# Patient Record
Sex: MALE | Race: WHITE | NOT HISPANIC OR LATINO | URBAN - METROPOLITAN AREA
[De-identification: names, ages, dates, MRNs, and addresses within clinical notes are randomized per-mention and may not be internally consistent; named-entity substitution may affect disease eponyms.]

---

## 2019-04-26 ENCOUNTER — OUTPATIENT (OUTPATIENT)
Dept: OUTPATIENT SERVICES | Facility: HOSPITAL | Age: 54
LOS: 1 days | Discharge: STILL ADMITTED OR ADMINISTRATIVE | End: 2019-04-26

## 2019-04-26 ENCOUNTER — INPATIENT (INPATIENT)
Facility: HOSPITAL | Age: 54
LOS: 13 days | Discharge: HOME | End: 2019-05-10
Attending: PSYCHIATRY & NEUROLOGY | Admitting: PSYCHIATRY & NEUROLOGY
Payer: COMMERCIAL

## 2019-04-26 VITALS — TEMPERATURE: 98 F

## 2019-04-26 DIAGNOSIS — F31.0 BIPOLAR DISORDER, CURRENT EPISODE HYPOMANIC: ICD-10-CM

## 2019-04-26 PROCEDURE — 90792 PSYCH DIAG EVAL W/MED SRVCS: CPT

## 2019-04-26 RX ORDER — LANOLIN ALCOHOL/MO/W.PET/CERES
3 CREAM (GRAM) TOPICAL ONCE
Qty: 0 | Refills: 0 | Status: COMPLETED | OUTPATIENT
Start: 2019-04-26 | End: 2019-04-26

## 2019-04-26 RX ORDER — VENLAFAXINE HCL 75 MG
150 CAPSULE, EXT RELEASE 24 HR ORAL DAILY
Qty: 0 | Refills: 0 | Status: DISCONTINUED | OUTPATIENT
Start: 2019-04-27 | End: 2019-04-30

## 2019-04-26 RX ORDER — GEMFIBROZIL 600 MG
600 TABLET ORAL
Qty: 0 | Refills: 0 | Status: DISCONTINUED | OUTPATIENT
Start: 2019-04-26 | End: 2019-05-10

## 2019-04-26 RX ORDER — LAMOTRIGINE 25 MG/1
1 TABLET, ORALLY DISINTEGRATING ORAL
Qty: 0 | Refills: 0 | COMMUNITY

## 2019-04-26 RX ORDER — LURASIDONE HYDROCHLORIDE 40 MG/1
40 TABLET ORAL ONCE
Qty: 0 | Refills: 0 | Status: COMPLETED | OUTPATIENT
Start: 2019-04-26 | End: 2019-04-26

## 2019-04-26 RX ORDER — LAMOTRIGINE 25 MG/1
150 TABLET, ORALLY DISINTEGRATING ORAL DAILY
Qty: 0 | Refills: 0 | Status: DISCONTINUED | OUTPATIENT
Start: 2019-04-26 | End: 2019-04-26

## 2019-04-26 RX ORDER — LURASIDONE HYDROCHLORIDE 40 MG/1
40 TABLET ORAL
Qty: 0 | Refills: 0 | Status: DISCONTINUED | OUTPATIENT
Start: 2019-04-27 | End: 2019-04-30

## 2019-04-26 RX ORDER — LURASIDONE HYDROCHLORIDE 40 MG/1
40 TABLET ORAL DAILY
Qty: 0 | Refills: 0 | Status: DISCONTINUED | OUTPATIENT
Start: 2019-04-26 | End: 2019-04-26

## 2019-04-26 RX ORDER — VENLAFAXINE HCL 75 MG
187.5 CAPSULE, EXT RELEASE 24 HR ORAL DAILY
Qty: 0 | Refills: 0 | Status: DISCONTINUED | OUTPATIENT
Start: 2019-04-26 | End: 2019-04-26

## 2019-04-26 RX ADMIN — Medication 600 MILLIGRAM(S): at 22:07

## 2019-04-26 RX ADMIN — Medication 3 MILLIGRAM(S): at 22:45

## 2019-04-26 RX ADMIN — LURASIDONE HYDROCHLORIDE 40 MILLIGRAM(S): 40 TABLET ORAL at 17:05

## 2019-04-27 DIAGNOSIS — I10 ESSENTIAL (PRIMARY) HYPERTENSION: ICD-10-CM

## 2019-04-27 DIAGNOSIS — F31.0 BIPOLAR DISORDER, CURRENT EPISODE HYPOMANIC: ICD-10-CM

## 2019-04-27 DIAGNOSIS — E78.5 HYPERLIPIDEMIA, UNSPECIFIED: ICD-10-CM

## 2019-04-27 LAB
ALBUMIN SERPL ELPH-MCNC: 4.9 G/DL — SIGNIFICANT CHANGE UP (ref 3.3–5)
ALP SERPL-CCNC: 74 U/L — SIGNIFICANT CHANGE UP (ref 40–120)
ALT FLD-CCNC: 24 U/L — SIGNIFICANT CHANGE UP (ref 4–41)
ANION GAP SERPL CALC-SCNC: 14 MMO/L — SIGNIFICANT CHANGE UP (ref 7–14)
APAP SERPL-MCNC: < 15 UG/ML — LOW (ref 15–25)
AST SERPL-CCNC: 16 U/L — SIGNIFICANT CHANGE UP (ref 4–40)
BASOPHILS # BLD AUTO: 0.04 K/UL — SIGNIFICANT CHANGE UP (ref 0–0.2)
BASOPHILS NFR BLD AUTO: 0.7 % — SIGNIFICANT CHANGE UP (ref 0–2)
BILIRUB SERPL-MCNC: 0.7 MG/DL — SIGNIFICANT CHANGE UP (ref 0.2–1.2)
BUN SERPL-MCNC: 15 MG/DL — SIGNIFICANT CHANGE UP (ref 7–23)
CALCIUM SERPL-MCNC: 9.6 MG/DL — SIGNIFICANT CHANGE UP (ref 8.4–10.5)
CHLORIDE SERPL-SCNC: 101 MMOL/L — SIGNIFICANT CHANGE UP (ref 98–107)
CHOLEST SERPL-MCNC: 247 MG/DL — HIGH (ref 120–199)
CO2 SERPL-SCNC: 24 MMOL/L — SIGNIFICANT CHANGE UP (ref 22–31)
CREAT SERPL-MCNC: 0.91 MG/DL — SIGNIFICANT CHANGE UP (ref 0.5–1.3)
EOSINOPHIL # BLD AUTO: 0.07 K/UL — SIGNIFICANT CHANGE UP (ref 0–0.5)
EOSINOPHIL NFR BLD AUTO: 1.2 % — SIGNIFICANT CHANGE UP (ref 0–6)
ETHANOL BLD-MCNC: < 10 MG/DL — SIGNIFICANT CHANGE UP
GLUCOSE SERPL-MCNC: 157 MG/DL — HIGH (ref 70–99)
HBA1C BLD-MCNC: 5.6 % — SIGNIFICANT CHANGE UP (ref 4–5.6)
HCT VFR BLD CALC: 45.2 % — SIGNIFICANT CHANGE UP (ref 39–50)
HDLC SERPL-MCNC: 57 MG/DL — HIGH (ref 35–55)
HGB BLD-MCNC: 15.2 G/DL — SIGNIFICANT CHANGE UP (ref 13–17)
IMM GRANULOCYTES NFR BLD AUTO: 0.3 % — SIGNIFICANT CHANGE UP (ref 0–1.5)
LIPID PNL WITH DIRECT LDL SERPL: 185 MG/DL — SIGNIFICANT CHANGE UP
LYMPHOCYTES # BLD AUTO: 1.24 K/UL — SIGNIFICANT CHANGE UP (ref 1–3.3)
LYMPHOCYTES # BLD AUTO: 20.6 % — SIGNIFICANT CHANGE UP (ref 13–44)
MCHC RBC-ENTMCNC: 29.5 PG — SIGNIFICANT CHANGE UP (ref 27–34)
MCHC RBC-ENTMCNC: 33.6 % — SIGNIFICANT CHANGE UP (ref 32–36)
MCV RBC AUTO: 87.8 FL — SIGNIFICANT CHANGE UP (ref 80–100)
MONOCYTES # BLD AUTO: 0.43 K/UL — SIGNIFICANT CHANGE UP (ref 0–0.9)
MONOCYTES NFR BLD AUTO: 7.1 % — SIGNIFICANT CHANGE UP (ref 2–14)
NEUTROPHILS # BLD AUTO: 4.23 K/UL — SIGNIFICANT CHANGE UP (ref 1.8–7.4)
NEUTROPHILS NFR BLD AUTO: 70.1 % — SIGNIFICANT CHANGE UP (ref 43–77)
NRBC # FLD: 0 K/UL — SIGNIFICANT CHANGE UP (ref 0–0)
PLATELET # BLD AUTO: 180 K/UL — SIGNIFICANT CHANGE UP (ref 150–400)
PMV BLD: 11.4 FL — SIGNIFICANT CHANGE UP (ref 7–13)
POTASSIUM SERPL-MCNC: 4.1 MMOL/L — SIGNIFICANT CHANGE UP (ref 3.5–5.3)
POTASSIUM SERPL-SCNC: 4.1 MMOL/L — SIGNIFICANT CHANGE UP (ref 3.5–5.3)
PROT SERPL-MCNC: 7.5 G/DL — SIGNIFICANT CHANGE UP (ref 6–8.3)
RBC # BLD: 5.15 M/UL — SIGNIFICANT CHANGE UP (ref 4.2–5.8)
RBC # FLD: 13.3 % — SIGNIFICANT CHANGE UP (ref 10.3–14.5)
SALICYLATES SERPL-MCNC: < 5 MG/DL — LOW (ref 15–30)
SODIUM SERPL-SCNC: 139 MMOL/L — SIGNIFICANT CHANGE UP (ref 135–145)
TRIGL SERPL-MCNC: 139 MG/DL — SIGNIFICANT CHANGE UP (ref 10–149)
TSH SERPL-MCNC: 2.01 UIU/ML — SIGNIFICANT CHANGE UP (ref 0.27–4.2)
WBC # BLD: 6.03 K/UL — SIGNIFICANT CHANGE UP (ref 3.8–10.5)
WBC # FLD AUTO: 6.03 K/UL — SIGNIFICANT CHANGE UP (ref 3.8–10.5)

## 2019-04-27 PROCEDURE — 99232 SBSQ HOSP IP/OBS MODERATE 35: CPT

## 2019-04-27 PROCEDURE — 99223 1ST HOSP IP/OBS HIGH 75: CPT

## 2019-04-27 PROCEDURE — 93010 ELECTROCARDIOGRAM REPORT: CPT

## 2019-04-27 RX ORDER — AMLODIPINE BESYLATE 2.5 MG/1
2.5 TABLET ORAL DAILY
Qty: 0 | Refills: 0 | Status: DISCONTINUED | OUTPATIENT
Start: 2019-04-27 | End: 2019-05-01

## 2019-04-27 RX ADMIN — Medication 600 MILLIGRAM(S): at 21:25

## 2019-04-27 RX ADMIN — Medication 600 MILLIGRAM(S): at 09:05

## 2019-04-27 RX ADMIN — AMLODIPINE BESYLATE 2.5 MILLIGRAM(S): 2.5 TABLET ORAL at 11:46

## 2019-04-27 RX ADMIN — LURASIDONE HYDROCHLORIDE 40 MILLIGRAM(S): 40 TABLET ORAL at 16:34

## 2019-04-27 RX ADMIN — Medication 150 MILLIGRAM(S): at 09:05

## 2019-04-27 NOTE — CONSULT NOTE ADULT - ASSESSMENT
54M with HLD and Bipolar d/o admitted for hypomanic episode. Med consult for HTN. Patient is asymptomatic. likely underline undiagnosed HTN. more diastolic.

## 2019-04-27 NOTE — CONSULT NOTE ADULT - SUBJECTIVE AND OBJECTIVE BOX
HPI:   54M with PMH HLD, Bipolar d/o who was admitted for [facility] for hypomanic episode. Medicine was consulted for management of new found hypertension. Patient denied history of HTN. He reports only outpatient follow up for annual physicals. Denied any chest pain, SOB, LYNN, dizziness, headache. He reports he does feel a little bit anxious while here, but otherwise feels well. Reviewing of patients vitals showing persistent elevated diastolic BP and now with systolic elevation as well.     PAST MEDICAL & SURGICAL HISTORY:  Hyperlipidemia [E78.5]    Review of Systems:   CONSTITUTIONAL: No fever, weight loss, or fatigue  EYES: No eye pain, visual disturbances, or discharge  ENMT:  No difficulty hearing, tinnitus, vertigo; No sinus or throat pain  NECK: No pain or stiffness  RESPIRATORY: No cough, wheezing, chills or hemoptysis; No shortness of breath  CARDIOVASCULAR: No chest pain, palpitations, dizziness, or leg swelling  GASTROINTESTINAL: No abdominal or epigastric pain. No nausea, vomiting, or hematemesis; No diarrhea or constipation. No melena or hematochezia.  GENITOURINARY: No dysuria, frequency, hematuria, or incontinence  NEUROLOGICAL: No headaches, memory loss, loss of strength, numbness, or tremors  SKIN: No itching, burning, rashes, or lesions   LYMPH NODES: No enlarged glands  ENDOCRINE: No heat or cold intolerance; No hair loss  MUSCULOSKELETAL: No joint pain or swelling; No muscle, back, or extremity pain  HEME/LYMPH: No easy bruising, or bleeding gums  ALLERY AND IMMUNOLOGIC: No hives or eczema    Allergies    No Known Allergies    Intolerances        Social History:   Denied history of tobacco use, alcohol use or drug use.     FAMILY HISTORY:  Family history of stroke or transient ischemic attack in father [Z82.3]      MEDICATIONS  (STANDING):  amLODIPine   Tablet 2.5 milliGRAM(s) Oral daily  gemfibrozil 600 milliGRAM(s) Oral two times a day  lurasidone 40 milliGRAM(s) Oral <User Schedule>  venlafaxine  milliGRAM(s) Oral daily    MEDICATIONS  (PRN):  LORazepam     Tablet 1 milliGRAM(s) Oral every 6 hours PRN anxiety/agitation  LORazepam   Injectable 1 milliGRAM(s) IntraMuscular once PRN Agitation      Vital Signs Last 24 Hrs  T(C): 36.6 (27 Apr 2019 08:30), Max: 36.7 (26 Apr 2019 18:21)  T(F): 97.8 (27 Apr 2019 08:30), Max: 98.1 (26 Apr 2019 18:21)  HR: 89 (27 Apr 2019 11:16) (87 - 89)  BP: 138/94 (27 Apr 2019 11:16) (138/94 - 150/98)  BP(mean): --  RR: --  SpO2: --  CAPILLARY BLOOD GLUCOSE            PHYSICAL EXAM:  GENERAL: NAD, well-developed  HEAD:  Atraumatic, Normocephalic  EYES: EOMI, conjunctiva and sclera clear  NECK: Supple, No JVD  CHEST/LUNG: Clear to auscultation bilaterally; No wheeze  HEART: Regular rate and rhythm; No murmurs, rubs, or gallops  ABDOMEN: Soft, Nontender, Nondistended; Bowel sounds present  EXTREMITIES:  2+ Peripheral Pulses, No clubbing, cyanosis, or edema  NEUROLOGY: non-focal  SKIN: No rashes or lesions    LABS:                        15.2   6.03  )-----------( 180      ( 27 Apr 2019 09:30 )             45.2     04-27    139  |  101  |  15  ----------------------------<  157<H>  4.1   |  24  |  0.91    Ca    9.6      27 Apr 2019 09:30    TPro  7.5  /  Alb  4.9  /  TBili  0.7  /  DBili  x   /  AST  16  /  ALT  24  /  AlkPhos  74  04-27              EKG(personally reviewed):    RADIOLOGY & ADDITIONAL TESTS:    Imaging Personally Reviewed:    Consultant(s) Notes Reviewed:      Care Discussed with Consultants/Other Providers:

## 2019-04-27 NOTE — CONSULT NOTE ADULT - PROBLEM SELECTOR RECOMMENDATION 9
asymptomatic. clinically does not appear to have any anxiety to contribute to his elevated BP.   Will start Amlodipine 2.5mg qd with holding parameter.   monitor BP and titrate as needed.

## 2019-04-28 LAB
AMPHET UR-MCNC: NEGATIVE — SIGNIFICANT CHANGE UP
APPEARANCE UR: CLEAR — SIGNIFICANT CHANGE UP
BACTERIA # UR AUTO: NEGATIVE — SIGNIFICANT CHANGE UP
BARBITURATES UR SCN-MCNC: NEGATIVE — SIGNIFICANT CHANGE UP
BENZODIAZ UR-MCNC: NEGATIVE — SIGNIFICANT CHANGE UP
BILIRUB UR-MCNC: NEGATIVE — SIGNIFICANT CHANGE UP
BLOOD UR QL VISUAL: NEGATIVE — SIGNIFICANT CHANGE UP
CANNABINOIDS UR-MCNC: POSITIVE — SIGNIFICANT CHANGE UP
COCAINE METAB.OTHER UR-MCNC: NEGATIVE — SIGNIFICANT CHANGE UP
COLOR SPEC: YELLOW — SIGNIFICANT CHANGE UP
GLUCOSE UR-MCNC: NEGATIVE — SIGNIFICANT CHANGE UP
HYALINE CASTS # UR AUTO: NEGATIVE — SIGNIFICANT CHANGE UP
KETONES UR-MCNC: NEGATIVE — SIGNIFICANT CHANGE UP
LEUKOCYTE ESTERASE UR-ACNC: NEGATIVE — SIGNIFICANT CHANGE UP
METHADONE UR-MCNC: NEGATIVE — SIGNIFICANT CHANGE UP
NITRITE UR-MCNC: NEGATIVE — SIGNIFICANT CHANGE UP
OPIATES UR-MCNC: POSITIVE — SIGNIFICANT CHANGE UP
OXYCODONE UR-MCNC: NEGATIVE — SIGNIFICANT CHANGE UP
PCP UR-MCNC: NEGATIVE — SIGNIFICANT CHANGE UP
PH UR: 6.5 — SIGNIFICANT CHANGE UP (ref 5–8)
PROT UR-MCNC: 100 — HIGH
RBC CASTS # UR COMP ASSIST: SIGNIFICANT CHANGE UP (ref 0–?)
SP GR SPEC: 1.03 — SIGNIFICANT CHANGE UP (ref 1–1.04)
SQUAMOUS # UR AUTO: SIGNIFICANT CHANGE UP
UROBILINOGEN FLD QL: NORMAL — SIGNIFICANT CHANGE UP
WBC UR QL: HIGH (ref 0–?)

## 2019-04-28 PROCEDURE — 99232 SBSQ HOSP IP/OBS MODERATE 35: CPT

## 2019-04-28 RX ORDER — ACETAMINOPHEN 500 MG
650 TABLET ORAL EVERY 6 HOURS
Qty: 0 | Refills: 0 | Status: DISCONTINUED | OUTPATIENT
Start: 2019-04-28 | End: 2019-05-10

## 2019-04-28 RX ADMIN — Medication 650 MILLIGRAM(S): at 09:45

## 2019-04-28 RX ADMIN — Medication 650 MILLIGRAM(S): at 08:45

## 2019-04-28 RX ADMIN — AMLODIPINE BESYLATE 2.5 MILLIGRAM(S): 2.5 TABLET ORAL at 08:56

## 2019-04-28 RX ADMIN — Medication 150 MILLIGRAM(S): at 08:56

## 2019-04-28 RX ADMIN — LURASIDONE HYDROCHLORIDE 40 MILLIGRAM(S): 40 TABLET ORAL at 17:04

## 2019-04-28 RX ADMIN — Medication 600 MILLIGRAM(S): at 21:56

## 2019-04-28 RX ADMIN — Medication 600 MILLIGRAM(S): at 08:56

## 2019-04-29 DIAGNOSIS — F31.4 BIPOLAR DISORDER, CURRENT EPISODE DEPRESSED, SEVERE, WITHOUT PSYCHOTIC FEATURES: ICD-10-CM

## 2019-04-29 DIAGNOSIS — E78.5 HYPERLIPIDEMIA, UNSPECIFIED: ICD-10-CM

## 2019-04-29 PROCEDURE — 99232 SBSQ HOSP IP/OBS MODERATE 35: CPT | Mod: GC

## 2019-04-29 RX ORDER — IBUPROFEN 200 MG
400 TABLET ORAL EVERY 6 HOURS
Qty: 0 | Refills: 0 | Status: DISCONTINUED | OUTPATIENT
Start: 2019-04-29 | End: 2019-05-10

## 2019-04-29 RX ADMIN — Medication 150 MILLIGRAM(S): at 08:06

## 2019-04-29 RX ADMIN — Medication 600 MILLIGRAM(S): at 21:17

## 2019-04-29 RX ADMIN — LURASIDONE HYDROCHLORIDE 40 MILLIGRAM(S): 40 TABLET ORAL at 17:51

## 2019-04-29 RX ADMIN — Medication 400 MILLIGRAM(S): at 12:18

## 2019-04-29 RX ADMIN — AMLODIPINE BESYLATE 2.5 MILLIGRAM(S): 2.5 TABLET ORAL at 08:06

## 2019-04-29 RX ADMIN — Medication 650 MILLIGRAM(S): at 08:50

## 2019-04-29 RX ADMIN — Medication 400 MILLIGRAM(S): at 13:00

## 2019-04-29 RX ADMIN — Medication 600 MILLIGRAM(S): at 08:06

## 2019-04-29 RX ADMIN — Medication 650 MILLIGRAM(S): at 08:05

## 2019-04-30 PROCEDURE — 99232 SBSQ HOSP IP/OBS MODERATE 35: CPT | Mod: GC

## 2019-04-30 RX ORDER — LURASIDONE HYDROCHLORIDE 40 MG/1
60 TABLET ORAL
Qty: 0 | Refills: 0 | Status: DISCONTINUED | OUTPATIENT
Start: 2019-04-30 | End: 2019-05-03

## 2019-04-30 RX ORDER — VENLAFAXINE HCL 75 MG
112.5 CAPSULE, EXT RELEASE 24 HR ORAL DAILY
Qty: 0 | Refills: 0 | Status: DISCONTINUED | OUTPATIENT
Start: 2019-04-30 | End: 2019-05-03

## 2019-04-30 RX ADMIN — Medication 600 MILLIGRAM(S): at 09:30

## 2019-04-30 RX ADMIN — Medication 600 MILLIGRAM(S): at 21:40

## 2019-04-30 RX ADMIN — LURASIDONE HYDROCHLORIDE 40 MILLIGRAM(S): 40 TABLET ORAL at 16:43

## 2019-04-30 RX ADMIN — AMLODIPINE BESYLATE 2.5 MILLIGRAM(S): 2.5 TABLET ORAL at 09:30

## 2019-04-30 RX ADMIN — Medication 150 MILLIGRAM(S): at 09:30

## 2019-05-01 LAB — GLUCOSE BLDC GLUCOMTR-MCNC: 162 MG/DL — HIGH (ref 70–99)

## 2019-05-01 PROCEDURE — 90870 ELECTROCONVULSIVE THERAPY: CPT

## 2019-05-01 PROCEDURE — 99232 SBSQ HOSP IP/OBS MODERATE 35: CPT | Mod: GC,25

## 2019-05-01 RX ORDER — MIDAZOLAM HYDROCHLORIDE 1 MG/ML
2 INJECTION, SOLUTION INTRAMUSCULAR; INTRAVENOUS ONCE
Qty: 0 | Refills: 0 | Status: DISCONTINUED | OUTPATIENT
Start: 2019-05-01 | End: 2019-05-01

## 2019-05-01 RX ORDER — AMLODIPINE BESYLATE 2.5 MG/1
5 TABLET ORAL DAILY
Qty: 0 | Refills: 0 | Status: DISCONTINUED | OUTPATIENT
Start: 2019-05-01 | End: 2019-05-10

## 2019-05-01 RX ADMIN — Medication 112.5 MILLIGRAM(S): at 17:23

## 2019-05-01 RX ADMIN — LURASIDONE HYDROCHLORIDE 60 MILLIGRAM(S): 40 TABLET ORAL at 20:07

## 2019-05-01 RX ADMIN — Medication 1 MILLIGRAM(S): at 20:07

## 2019-05-01 RX ADMIN — Medication 650 MILLIGRAM(S): at 16:16

## 2019-05-01 RX ADMIN — Medication 650 MILLIGRAM(S): at 17:07

## 2019-05-01 RX ADMIN — AMLODIPINE BESYLATE 5 MILLIGRAM(S): 2.5 TABLET ORAL at 08:40

## 2019-05-01 RX ADMIN — MIDAZOLAM HYDROCHLORIDE 2 MILLIGRAM(S): 1 INJECTION, SOLUTION INTRAMUSCULAR; INTRAVENOUS at 11:00

## 2019-05-01 RX ADMIN — Medication 600 MILLIGRAM(S): at 20:07

## 2019-05-02 PROCEDURE — 99232 SBSQ HOSP IP/OBS MODERATE 35: CPT | Mod: GC

## 2019-05-02 RX ADMIN — Medication 600 MILLIGRAM(S): at 10:28

## 2019-05-02 RX ADMIN — AMLODIPINE BESYLATE 5 MILLIGRAM(S): 2.5 TABLET ORAL at 10:28

## 2019-05-02 RX ADMIN — LURASIDONE HYDROCHLORIDE 60 MILLIGRAM(S): 40 TABLET ORAL at 17:25

## 2019-05-02 RX ADMIN — Medication 112.5 MILLIGRAM(S): at 10:28

## 2019-05-02 RX ADMIN — Medication 600 MILLIGRAM(S): at 21:08

## 2019-05-03 PROCEDURE — 90870 ELECTROCONVULSIVE THERAPY: CPT

## 2019-05-03 PROCEDURE — 99232 SBSQ HOSP IP/OBS MODERATE 35: CPT | Mod: GC,25

## 2019-05-03 RX ORDER — LURASIDONE HYDROCHLORIDE 40 MG/1
80 TABLET ORAL
Qty: 0 | Refills: 0 | Status: DISCONTINUED | OUTPATIENT
Start: 2019-05-04 | End: 2019-05-06

## 2019-05-03 RX ORDER — VENLAFAXINE HCL 75 MG
75 CAPSULE, EXT RELEASE 24 HR ORAL DAILY
Qty: 0 | Refills: 0 | Status: DISCONTINUED | OUTPATIENT
Start: 2019-05-04 | End: 2019-05-06

## 2019-05-03 RX ORDER — MIDAZOLAM HYDROCHLORIDE 1 MG/ML
2 INJECTION, SOLUTION INTRAMUSCULAR; INTRAVENOUS ONCE
Qty: 0 | Refills: 0 | Status: DISCONTINUED | OUTPATIENT
Start: 2019-05-03 | End: 2019-05-03

## 2019-05-03 RX ORDER — LURASIDONE HYDROCHLORIDE 40 MG/1
60 TABLET ORAL DAILY
Qty: 0 | Refills: 0 | Status: COMPLETED | OUTPATIENT
Start: 2019-05-03 | End: 2019-05-03

## 2019-05-03 RX ADMIN — MIDAZOLAM HYDROCHLORIDE 2 MILLIGRAM(S): 1 INJECTION, SOLUTION INTRAMUSCULAR; INTRAVENOUS at 10:13

## 2019-05-03 RX ADMIN — LURASIDONE HYDROCHLORIDE 60 MILLIGRAM(S): 40 TABLET ORAL at 16:44

## 2019-05-03 RX ADMIN — Medication 112.5 MILLIGRAM(S): at 08:12

## 2019-05-03 RX ADMIN — Medication 600 MILLIGRAM(S): at 08:12

## 2019-05-03 RX ADMIN — AMLODIPINE BESYLATE 5 MILLIGRAM(S): 2.5 TABLET ORAL at 08:12

## 2019-05-03 RX ADMIN — Medication 600 MILLIGRAM(S): at 21:57

## 2019-05-04 RX ORDER — LANOLIN ALCOHOL/MO/W.PET/CERES
5 CREAM (GRAM) TOPICAL AT BEDTIME
Qty: 0 | Refills: 0 | Status: DISCONTINUED | OUTPATIENT
Start: 2019-05-04 | End: 2019-05-10

## 2019-05-04 RX ADMIN — AMLODIPINE BESYLATE 5 MILLIGRAM(S): 2.5 TABLET ORAL at 09:31

## 2019-05-04 RX ADMIN — Medication 600 MILLIGRAM(S): at 21:29

## 2019-05-04 RX ADMIN — Medication 5 MILLIGRAM(S): at 21:29

## 2019-05-04 RX ADMIN — Medication 75 MILLIGRAM(S): at 09:31

## 2019-05-04 RX ADMIN — Medication 600 MILLIGRAM(S): at 09:31

## 2019-05-04 RX ADMIN — LURASIDONE HYDROCHLORIDE 80 MILLIGRAM(S): 40 TABLET ORAL at 18:14

## 2019-05-05 RX ADMIN — LURASIDONE HYDROCHLORIDE 80 MILLIGRAM(S): 40 TABLET ORAL at 17:20

## 2019-05-05 RX ADMIN — Medication 600 MILLIGRAM(S): at 08:34

## 2019-05-05 RX ADMIN — Medication 600 MILLIGRAM(S): at 21:00

## 2019-05-05 RX ADMIN — Medication 75 MILLIGRAM(S): at 08:34

## 2019-05-05 RX ADMIN — Medication 1 MILLIGRAM(S): at 18:43

## 2019-05-05 RX ADMIN — AMLODIPINE BESYLATE 5 MILLIGRAM(S): 2.5 TABLET ORAL at 08:34

## 2019-05-06 PROCEDURE — 99232 SBSQ HOSP IP/OBS MODERATE 35: CPT | Mod: GC,25

## 2019-05-06 PROCEDURE — 90870 ELECTROCONVULSIVE THERAPY: CPT

## 2019-05-06 RX ORDER — LURASIDONE HYDROCHLORIDE 40 MG/1
100 TABLET ORAL
Qty: 0 | Refills: 0 | Status: DISCONTINUED | OUTPATIENT
Start: 2019-05-06 | End: 2019-05-10

## 2019-05-06 RX ORDER — VENLAFAXINE HCL 75 MG
37.5 CAPSULE, EXT RELEASE 24 HR ORAL DAILY
Qty: 0 | Refills: 0 | Status: DISCONTINUED | OUTPATIENT
Start: 2019-05-06 | End: 2019-05-08

## 2019-05-06 RX ADMIN — LURASIDONE HYDROCHLORIDE 80 MILLIGRAM(S): 40 TABLET ORAL at 16:59

## 2019-05-06 RX ADMIN — Medication 1 MILLIGRAM(S): at 20:30

## 2019-05-06 RX ADMIN — Medication 5 MILLIGRAM(S): at 20:40

## 2019-05-06 RX ADMIN — AMLODIPINE BESYLATE 5 MILLIGRAM(S): 2.5 TABLET ORAL at 08:35

## 2019-05-06 RX ADMIN — Medication 600 MILLIGRAM(S): at 08:35

## 2019-05-06 RX ADMIN — Medication 75 MILLIGRAM(S): at 08:35

## 2019-05-06 RX ADMIN — Medication 600 MILLIGRAM(S): at 20:41

## 2019-05-07 LAB
ALBUMIN SERPL ELPH-MCNC: 4.9 G/DL — SIGNIFICANT CHANGE UP (ref 3.3–5)
ALP SERPL-CCNC: 84 U/L — SIGNIFICANT CHANGE UP (ref 40–120)
ALT FLD-CCNC: 17 U/L — SIGNIFICANT CHANGE UP (ref 4–41)
ANION GAP SERPL CALC-SCNC: 13 MMO/L — SIGNIFICANT CHANGE UP (ref 7–14)
AST SERPL-CCNC: 14 U/L — SIGNIFICANT CHANGE UP (ref 4–40)
BILIRUB SERPL-MCNC: 0.8 MG/DL — SIGNIFICANT CHANGE UP (ref 0.2–1.2)
BUN SERPL-MCNC: 21 MG/DL — SIGNIFICANT CHANGE UP (ref 7–23)
CALCIUM SERPL-MCNC: 9.9 MG/DL — SIGNIFICANT CHANGE UP (ref 8.4–10.5)
CHLORIDE SERPL-SCNC: 103 MMOL/L — SIGNIFICANT CHANGE UP (ref 98–107)
CO2 SERPL-SCNC: 23 MMOL/L — SIGNIFICANT CHANGE UP (ref 22–31)
CREAT SERPL-MCNC: 0.92 MG/DL — SIGNIFICANT CHANGE UP (ref 0.5–1.3)
GLUCOSE SERPL-MCNC: 104 MG/DL — HIGH (ref 70–99)
HCT VFR BLD CALC: 45.1 % — SIGNIFICANT CHANGE UP (ref 39–50)
HGB BLD-MCNC: 14.9 G/DL — SIGNIFICANT CHANGE UP (ref 13–17)
MAGNESIUM SERPL-MCNC: 2.3 MG/DL — SIGNIFICANT CHANGE UP (ref 1.6–2.6)
MCHC RBC-ENTMCNC: 28.8 PG — SIGNIFICANT CHANGE UP (ref 27–34)
MCHC RBC-ENTMCNC: 33 % — SIGNIFICANT CHANGE UP (ref 32–36)
MCV RBC AUTO: 87.1 FL — SIGNIFICANT CHANGE UP (ref 80–100)
NRBC # FLD: 0 K/UL — SIGNIFICANT CHANGE UP (ref 0–0)
PHOSPHATE SERPL-MCNC: 3.2 MG/DL — SIGNIFICANT CHANGE UP (ref 2.5–4.5)
PLATELET # BLD AUTO: 187 K/UL — SIGNIFICANT CHANGE UP (ref 150–400)
PMV BLD: 11 FL — SIGNIFICANT CHANGE UP (ref 7–13)
POTASSIUM SERPL-MCNC: 4.4 MMOL/L — SIGNIFICANT CHANGE UP (ref 3.5–5.3)
POTASSIUM SERPL-SCNC: 4.4 MMOL/L — SIGNIFICANT CHANGE UP (ref 3.5–5.3)
PROT SERPL-MCNC: 8.1 G/DL — SIGNIFICANT CHANGE UP (ref 6–8.3)
RBC # BLD: 5.18 M/UL — SIGNIFICANT CHANGE UP (ref 4.2–5.8)
RBC # FLD: 13.2 % — SIGNIFICANT CHANGE UP (ref 10.3–14.5)
SODIUM SERPL-SCNC: 139 MMOL/L — SIGNIFICANT CHANGE UP (ref 135–145)
WBC # BLD: 10.06 K/UL — SIGNIFICANT CHANGE UP (ref 3.8–10.5)
WBC # FLD AUTO: 10.06 K/UL — SIGNIFICANT CHANGE UP (ref 3.8–10.5)

## 2019-05-07 PROCEDURE — 99232 SBSQ HOSP IP/OBS MODERATE 35: CPT | Mod: GC,25

## 2019-05-07 PROCEDURE — 93010 ELECTROCARDIOGRAM REPORT: CPT

## 2019-05-07 RX ORDER — MIRTAZAPINE 45 MG/1
7.5 TABLET, ORALLY DISINTEGRATING ORAL AT BEDTIME
Qty: 0 | Refills: 0 | Status: DISCONTINUED | OUTPATIENT
Start: 2019-05-07 | End: 2019-05-10

## 2019-05-07 RX ADMIN — MIRTAZAPINE 7.5 MILLIGRAM(S): 45 TABLET, ORALLY DISINTEGRATING ORAL at 22:09

## 2019-05-07 RX ADMIN — AMLODIPINE BESYLATE 5 MILLIGRAM(S): 2.5 TABLET ORAL at 09:18

## 2019-05-07 RX ADMIN — LURASIDONE HYDROCHLORIDE 100 MILLIGRAM(S): 40 TABLET ORAL at 17:44

## 2019-05-07 RX ADMIN — Medication 600 MILLIGRAM(S): at 09:18

## 2019-05-07 RX ADMIN — Medication 600 MILLIGRAM(S): at 22:09

## 2019-05-07 RX ADMIN — Medication 37.5 MILLIGRAM(S): at 09:18

## 2019-05-08 PROCEDURE — 90870 ELECTROCONVULSIVE THERAPY: CPT

## 2019-05-08 PROCEDURE — 99232 SBSQ HOSP IP/OBS MODERATE 35: CPT

## 2019-05-08 RX ADMIN — LURASIDONE HYDROCHLORIDE 100 MILLIGRAM(S): 40 TABLET ORAL at 16:21

## 2019-05-08 RX ADMIN — MIRTAZAPINE 7.5 MILLIGRAM(S): 45 TABLET, ORALLY DISINTEGRATING ORAL at 21:22

## 2019-05-08 RX ADMIN — AMLODIPINE BESYLATE 5 MILLIGRAM(S): 2.5 TABLET ORAL at 08:20

## 2019-05-08 RX ADMIN — Medication 5 MILLIGRAM(S): at 21:20

## 2019-05-08 RX ADMIN — Medication 600 MILLIGRAM(S): at 08:20

## 2019-05-08 RX ADMIN — Medication 37.5 MILLIGRAM(S): at 08:20

## 2019-05-08 RX ADMIN — Medication 600 MILLIGRAM(S): at 21:22

## 2019-05-09 PROBLEM — E78.5 HYPERLIPIDEMIA, UNSPECIFIED: Chronic | Status: ACTIVE | Noted: 2019-04-27

## 2019-05-09 PROCEDURE — 99232 SBSQ HOSP IP/OBS MODERATE 35: CPT | Mod: GC

## 2019-05-09 RX ORDER — GEMFIBROZIL 600 MG
1 TABLET ORAL
Qty: 28 | Refills: 0
Start: 2019-05-09 | End: 2019-05-22

## 2019-05-09 RX ORDER — LURASIDONE HYDROCHLORIDE 40 MG/1
1 TABLET ORAL
Qty: 14 | Refills: 0
Start: 2019-05-09 | End: 2019-05-22

## 2019-05-09 RX ORDER — GEMFIBROZIL 600 MG
1 TABLET ORAL
Qty: 0 | Refills: 0 | DISCHARGE

## 2019-05-09 RX ORDER — LURASIDONE HYDROCHLORIDE 40 MG/1
1 TABLET ORAL
Qty: 0 | Refills: 0 | DISCHARGE

## 2019-05-09 RX ORDER — AMLODIPINE BESYLATE 2.5 MG/1
1 TABLET ORAL
Qty: 14 | Refills: 0
Start: 2019-05-09 | End: 2019-05-22

## 2019-05-09 RX ORDER — MIRTAZAPINE 45 MG/1
1 TABLET, ORALLY DISINTEGRATING ORAL
Qty: 14 | Refills: 0
Start: 2019-05-09 | End: 2019-05-22

## 2019-05-09 RX ORDER — VENLAFAXINE HCL 75 MG
187.5 CAPSULE, EXT RELEASE 24 HR ORAL
Qty: 0 | Refills: 0 | DISCHARGE

## 2019-05-09 RX ADMIN — AMLODIPINE BESYLATE 5 MILLIGRAM(S): 2.5 TABLET ORAL at 09:17

## 2019-05-09 RX ADMIN — MIRTAZAPINE 7.5 MILLIGRAM(S): 45 TABLET, ORALLY DISINTEGRATING ORAL at 21:08

## 2019-05-09 RX ADMIN — Medication 600 MILLIGRAM(S): at 21:08

## 2019-05-09 RX ADMIN — Medication 600 MILLIGRAM(S): at 09:17

## 2019-05-09 RX ADMIN — LURASIDONE HYDROCHLORIDE 100 MILLIGRAM(S): 40 TABLET ORAL at 17:27

## 2019-05-10 VITALS — TEMPERATURE: 98 F

## 2019-05-10 PROCEDURE — 99239 HOSP IP/OBS DSCHRG MGMT >30: CPT | Mod: GC

## 2019-05-10 RX ADMIN — AMLODIPINE BESYLATE 5 MILLIGRAM(S): 2.5 TABLET ORAL at 09:03

## 2019-05-10 RX ADMIN — Medication 600 MILLIGRAM(S): at 09:03

## 2021-02-19 DIAGNOSIS — I10 ESSENTIAL (PRIMARY) HYPERTENSION: ICD-10-CM

## 2021-02-19 DIAGNOSIS — Z82.49 FAMILY HISTORY OF ISCHEMIC HEART DISEASE AND OTHER DISEASES OF THE CIRCULATORY SYSTEM: ICD-10-CM

## 2021-02-22 ENCOUNTER — APPOINTMENT (OUTPATIENT)
Dept: UROLOGY | Facility: CLINIC | Age: 56
End: 2021-02-22
Payer: COMMERCIAL

## 2021-02-22 VITALS
BODY MASS INDEX: 28.14 KG/M2 | HEIGHT: 69 IN | SYSTOLIC BLOOD PRESSURE: 131 MMHG | TEMPERATURE: 97.4 F | HEART RATE: 86 BPM | WEIGHT: 190 LBS | OXYGEN SATURATION: 97 % | DIASTOLIC BLOOD PRESSURE: 94 MMHG

## 2021-02-22 DIAGNOSIS — R33.8 OTHER RETENTION OF URINE: ICD-10-CM

## 2021-02-22 LAB
BILIRUB UR QL STRIP: NORMAL
CLARITY UR: CLEAR
COLLECTION METHOD: NORMAL
GLUCOSE UR-MCNC: NORMAL
HCG UR QL: 0.2 EU/DL
HGB UR QL STRIP.AUTO: NORMAL
KETONES UR-MCNC: NORMAL
LEUKOCYTE ESTERASE UR QL STRIP: NORMAL
NITRITE UR QL STRIP: NORMAL
PH UR STRIP: 7
PROT UR STRIP-MCNC: NORMAL
SP GR UR STRIP: 1.02

## 2021-02-22 PROCEDURE — 99072 ADDL SUPL MATRL&STAF TM PHE: CPT

## 2021-02-22 PROCEDURE — 81003 URINALYSIS AUTO W/O SCOPE: CPT | Mod: QW

## 2021-02-22 PROCEDURE — 76857 US EXAM PELVIC LIMITED: CPT

## 2021-02-22 PROCEDURE — 99205 OFFICE O/P NEW HI 60 MIN: CPT | Mod: 25

## 2021-02-22 RX ORDER — NORTRIPTYLINE HCL 25 MG
25 CAPSULE ORAL
Refills: 0 | Status: ACTIVE | COMMUNITY

## 2021-02-22 RX ORDER — AMLODIPINE BESYLATE 10 MG/1
10 TABLET ORAL
Refills: 0 | Status: ACTIVE | COMMUNITY

## 2021-02-22 RX ORDER — PRAMIPEXOLE DIHYDROCHLORIDE 0.75 MG/1
TABLET ORAL
Refills: 0 | Status: ACTIVE | COMMUNITY

## 2021-02-22 NOTE — PHYSICAL EXAM
[Heart Rate And Rhythm] : Heart rate and rhythm were normal [Abdomen Mass (___ Cm)] : no abdominal mass palpated [Penis Abnormality] : normal uncircumcised penis [Epididymis] : the epididymides were normal [Testes Tenderness] : no tenderness of the testes [Prostate Tenderness] : the prostate was not tender [Skin Color & Pigmentation] : normal skin color and pigmentation [FreeTextEntry1] : Umbilical hernia (reducible).  Midline diastasis.

## 2021-02-22 NOTE — ADDENDUM
[FreeTextEntry1] : A portion of this note was written by [Bala Martinez] on 02/19/2021 acting as a scribe for Dr. Mcmahon. \par \par I have personally reviewed the chart and agree that the record accurately reflects my personal performance of the history, physical exam, assessment, and plan.

## 2021-02-22 NOTE — ASSESSMENT
[FreeTextEntry1] : I discussed the findings and options with . LAYA DURAND in detail.  A baseline serum testosterone and PSA are pending.  Regarding the erectile dysfunction, I recommended that he use Viagra 50 to 100* mg and I counseled him regarding how it should be taken and its potential side effects.  Peyronie's disease is a secondary issue, the management of which is dependent on the efficacy of pharmacologic intervention for erectile dysfunction. \par \par I discussed the diagnostic and treatment options for Peyronie's disease.  Regarding testing, intracavernosal injection with duplex ultrasonography is considered the standard approach.  Treatment options discussed included noninvasive and minimally invasive modalities (including extracorporal shockwave therapy, NSAIDs, penile traction, and intralesional verapamil, collagenase, interferon a2b) as well as surgical options (including plication, incision/excision and grafting, penile prosthesis placement). I outlined the potential complications of surgery, including bleeding, infection, persistent/recurrent curvature, penile/urethral injury, penile shortening, penile hyposensitivity.  \par \par There is no reason to pursue further diagnostic testing unless invasive intervention is planned.  For now we will focus on the libido and erectile dysfunction.  I advised that he begin vitamin E 400 IU twice daily understanding that its efficacy is questionable.\par \par I would like to see Mr. Durand, electively, in 6 months. In the meantime, I have asked him to please send us prior PSAs for comparison. \par \par *Rx Viagra 100mg given.

## 2021-02-23 LAB
PSA SERPL-MCNC: 1.69 NG/ML
TESTOST SERPL-MCNC: 408 NG/DL

## 2021-02-26 ENCOUNTER — APPOINTMENT (OUTPATIENT)
Dept: ENDOCRINOLOGY | Facility: CLINIC | Age: 56
End: 2021-02-26

## 2021-03-01 LAB — SHBG-ESOTERIX: 27.4 NMOL/L

## 2021-03-02 ENCOUNTER — APPOINTMENT (OUTPATIENT)
Dept: ENDOCRINOLOGY | Facility: CLINIC | Age: 56
End: 2021-03-02
Payer: COMMERCIAL

## 2021-03-02 VITALS
OXYGEN SATURATION: 97 % | SYSTOLIC BLOOD PRESSURE: 123 MMHG | TEMPERATURE: 97.1 F | WEIGHT: 191 LBS | HEIGHT: 69 IN | BODY MASS INDEX: 28.29 KG/M2 | HEART RATE: 84 BPM | DIASTOLIC BLOOD PRESSURE: 77 MMHG

## 2021-03-02 DIAGNOSIS — F32.9 MAJOR DEPRESSIVE DISORDER, SINGLE EPISODE, UNSPECIFIED: ICD-10-CM

## 2021-03-02 DIAGNOSIS — E04.1 NONTOXIC SINGLE THYROID NODULE: ICD-10-CM

## 2021-03-02 PROCEDURE — 99072 ADDL SUPL MATRL&STAF TM PHE: CPT

## 2021-03-02 PROCEDURE — 99205 OFFICE O/P NEW HI 60 MIN: CPT

## 2021-03-02 RX ORDER — IRBESARTAN 150 MG/1
150 TABLET ORAL
Qty: 30 | Refills: 0 | Status: ACTIVE | COMMUNITY
Start: 2021-02-25

## 2021-03-02 NOTE — CONSULT LETTER
[Dear  ___] : Dear  [unfilled], [Consult Letter:] : I had the pleasure of evaluating your patient, [unfilled]. [Please see my note below.] : Please see my note below. [Consult Closing:] : Thank you very much for allowing me to participate in the care of this patient.  If you have any questions, please do not hesitate to contact me. [Sincerely,] : Sincerely, [FreeTextEntry3] : Linda Wynn MD

## 2021-03-02 NOTE — ASSESSMENT
[Weight Loss] : weight loss [FreeTextEntry1] : Patient is a 57 yo man with newly diagnosed prediabetes and erectile dysfunction establishing endocrine care today\par \par 1. Prediabetes\par -A1c January 2021 was 6.2% (see scanned labs)\par -discussed this is prediabetes and recommended behavioral modifications to prevent progression to type 2 diabetes\par -over the past year he has been eating more and gained weight\par -once he was made aware of prediabetes he cut out bread and potatoes which were staple of his daily diet\par -continue with carbohydrate limitations\par -defer medications \par -repeat A1c in 3 month follow up\par \par 2. Erectile dysfunction\par -over the last year, he has had curvature of penis, low sex drive and no erections\par -he was evaluated by urology and prescribed viagara. Took first dose yesterday with no results\par -he does not have hypogonadism based on several testosterone levels which were above 250 (see scanned labs that were reviewed)\par -check thyroid function tests for low libido\par -discussed that nortriptyline can decrease libido and contribute to impotence. Recommend that he discuss this with his prescribing doctor, Dr. Carter\par -continue with urologic care to discuss alternative treatments for Peyronie's disease\par -discussed sexual symptoms can be multifactorial include depression, anatomic problems and relationship dynamics\par \par 3. Possible right thyroid nodule \par -refer for thyroid US\par \par Follow up in 3 months, sooner if needed

## 2021-03-02 NOTE — HISTORY OF PRESENT ILLNESS
[FreeTextEntry1] : Patient is a 55 yo man with impaired fasting glucose/prediabetes establishing endocrine care today.\par \par He was told of type 2 diabetes two weeks ago by PCP.  Patient just changed doctors and based on A1c of 6.2% he is referred there.  Never told of diabetes in the past. Has gained about 25 lbs in the past 1-2 years\par Breakfast: cereal with banana and two cups of coffee\par Lunch: over the last week, will have a cup of soup; tea or sandwich\par Dinner 7 PM: salmon, stew, burgers; occasional Chinese\par No soda and no juice\par Occasional sweets\par Walks in the park daily\par Used to eat bread and potatoes all the time, cut back two weeks ago.  \par \par Decreased libido\par States no erections over the last 12 months, also low sex drive up until recently.  The desire has come back a "bit" but no interaction. Has not had sex for about 12 months.  Established care with urology who recommended Viagara.  Took first dose last night and did not have any response.\par Puberty was normal\par No reported galactorrhea\par Has been on nortriptyline for about 6-8 months\par \par 1/18/2021\par A1c 6,2%\par \par HDL 56\par [id number]

## 2021-03-02 NOTE — PHYSICAL EXAM
[Alert] : alert [Well Nourished] : well nourished [No Acute Distress] : no acute distress [EOMI] : extra ocular movement intact [Normal Hearing] : hearing was normal [No Respiratory Distress] : no respiratory distress [No Accessory Muscle Use] : no accessory muscle use [Normal Rate and Effort] : normal respiratory rate and effort [Clear to Auscultation] : lungs were clear to auscultation bilaterally [Normal S1, S2] : normal S1 and S2 [Normal Rate] : heart rate was normal [Normal Bowel Sounds] : normal bowel sounds [Soft] : abdomen soft [No Stigmata of Cushings Syndrome] : no stigmata of Cushings Syndrome [Normal Gait] : normal gait [No Joint Swelling] : no joint swelling seen [No Motor Deficits] : the motor exam was normal [No Tremors] : no tremors [Normal Affect] : the affect was normal [Normal Insight/Judgement] : insight and judgment were intact [Normal Mood] : the mood was normal [de-identified] : ? right thyroid nodule

## 2021-03-02 NOTE — REVIEW OF SYSTEMS
[Fatigue] : no fatigue [Decreased Appetite] : appetite not decreased [Recent Weight Gain (___ Lbs)] : recent weight gain: [unfilled] lbs [Dysphagia] : no dysphagia [Chest Pain] : no chest pain [Slow Heart Rate] : heart rate is not slow [Palpitations] : no palpitations [Nausea] : no nausea [Vomiting] : no vomiting [Erectile Dysfunction] : erectile dysfunction [Decreased Libido] : decreased libido [Headaches] : no headaches [Tremors] : no tremors [Depression] : depression [Cold Intolerance] : no cold intolerance

## 2021-03-04 LAB
PROLACTIN SERPL-MCNC: 0.1 NG/ML
T3 SERPL-MCNC: 98 NG/DL
T4 FREE SERPL-MCNC: 1 NG/DL
THYROGLOB AB SERPL-ACNC: <20 IU/ML
THYROPEROXIDASE AB SERPL IA-ACNC: 14.9 IU/ML
TSH SERPL-ACNC: 2.08 UIU/ML

## 2021-03-09 ENCOUNTER — NON-APPOINTMENT (OUTPATIENT)
Age: 56
End: 2021-03-09

## 2021-03-11 ENCOUNTER — APPOINTMENT (OUTPATIENT)
Dept: UROLOGY | Facility: CLINIC | Age: 56
End: 2021-03-11
Payer: COMMERCIAL

## 2021-03-11 LAB
BILIRUB UR QL STRIP: NORMAL
CLARITY UR: CLEAR
COLLECTION METHOD: NORMAL
GLUCOSE UR-MCNC: NORMAL
HCG UR QL: 0.2 EU/DL
HGB UR QL STRIP.AUTO: NORMAL
KETONES UR-MCNC: NORMAL
LEUKOCYTE ESTERASE UR QL STRIP: NORMAL
NITRITE UR QL STRIP: NORMAL
PH UR STRIP: 5.5
PROT UR STRIP-MCNC: NORMAL
SP GR UR STRIP: 1.01

## 2021-03-11 PROCEDURE — 76857 US EXAM PELVIC LIMITED: CPT

## 2021-03-11 PROCEDURE — 99072 ADDL SUPL MATRL&STAF TM PHE: CPT

## 2021-03-11 PROCEDURE — 99215 OFFICE O/P EST HI 40 MIN: CPT | Mod: 25

## 2021-03-11 PROCEDURE — 81003 URINALYSIS AUTO W/O SCOPE: CPT | Mod: QW

## 2021-03-11 NOTE — ADDENDUM
[FreeTextEntry1] : A portion of this note was written by [Bala Martinez] on 03/10/2021 acting as a scribe for Dr. Mcmahon. \par \par I have personally reviewed the chart and agree that the record accurately reflects my personal performance of the history, physical exam, assessment, and plan.

## 2021-03-11 NOTE — ASSESSMENT
[FreeTextEntry1] : I discussed the findings and options with Mr. LAYA DURAND in detail.  \par \par The likely etiology of his scrotal pain is neurologic, due to the previously existing back pain and the pain's characteristics (e.g. radiating to the posterior ipsilateral thigh).  The scrotal sonographic findings are likely irrelevant in this setting. I have asked Mr. Durand to consider a referral to a neurologist and he will call Dr. Sanders's office for this. \par \par Regarding the erectile dysfunction, I have advised him to increase his Viagra dosage to 150mg prn. If he experiences no further improvement and he requests further treatment, I explained the possible next steps, including prostaglandin E1 injections and a penile prosthesis. \par \par No intervention is warranted at this time for the stable Peyronie's disease.\par \par Providing there are no new problems, I look forward to seeing Mr. Durand in one year (PSA, sono).

## 2021-03-11 NOTE — HISTORY OF PRESENT ILLNESS
[FreeTextEntry1] : Mr. LAYA DURAND comes in today for followup urgently because of 4-day onset of left testicular pain. It is intermittent of variable intensity (more pronounced with sitting) and radiates to the ipsilateral posterior thigh.  He also has lower back pain (which has become more pronounced in the past 2 weeks).   A scrotal sonogram showed a thrombosed left varicocele (see below).\par \par Approximately one year ago he noted a penile curvature almost simultaneously with the onset of inability to achieve adequate erections and decreased libido. The penile curve is described as right dorsolateral, approximated at 40 degrees. He denies any trauma or acute psychosocial issues, although he is being treated for depression. He was prescribed Cialis (?dose) without a subjective response.  He now uses Viagra 100mg, 2-3x/week, with suboptimal efficacy. The libido has recently improved. \par \par From his general urologic history, Mr. Durand presents with minimal lower urinary tract symptoms, including nocturia x 1. In 2012 he had one episode of urinary retention requiring catheterization (without an identifiable predisposing factor). \par Sono: 46cc PVR; 65cc prostate\par \par Testosterone: 2/23/21--408; 1/28/21--509.0; \par \par PSAs: 2/23/21--1.69; 1/18/21--1.90; \par \par Scrotal sono: 3/10/21--venous thrombosis of left varicocele.  Small right hydrocele and small right varicocele.\par

## 2021-03-11 NOTE — LETTER BODY
[Dear  ___] : Dear  [unfilled], [Consult Letter:] : I had the pleasure of evaluating your patient, [unfilled]. [Please see my note below.] : Please see my note below. [Consult Closing:] : Thank you very much for allowing me to participate in the care of this patient.  If you have any questions, please do not hesitate to contact me. [Sincerely,] : Sincerely, [FreeTextEntry3] : Arnoldo Mcmahon MD, FACS

## 2021-03-11 NOTE — PHYSICAL EXAM
[General Appearance - Well Developed] : well developed [General Appearance - Well Nourished] : well nourished [Normal Appearance] : normal appearance [Well Groomed] : well groomed [General Appearance - In No Acute Distress] : no acute distress [Abdomen Soft] : soft [Abdomen Tenderness] : non-tender [Abdomen Mass (___ Cm)] : no abdominal mass palpated [Costovertebral Angle Tenderness] : no ~M costovertebral angle tenderness [Urethral Meatus] : meatus normal [Penis Abnormality] : normal uncircumcised penis [Urinary Bladder Findings] : the bladder was normal on palpation [Scrotum] : the scrotum was normal [Epididymis] : the epididymides were normal [Testes Tenderness] : no tenderness of the testes [Testes Mass (___cm)] : there were no testicular masses [Skin Color & Pigmentation] : normal skin color and pigmentation [Heart Rate And Rhythm] : Heart rate and rhythm were normal [Edema] : no peripheral edema [] : no respiratory distress [Respiration, Rhythm And Depth] : normal respiratory rhythm and effort [Exaggerated Use Of Accessory Muscles For Inspiration] : no accessory muscle use [Oriented To Time, Place, And Person] : oriented to person, place, and time [Affect] : the affect was normal [Mood] : the mood was normal [Not Anxious] : not anxious [Normal Station and Gait] : the gait and station were normal for the patient's age [No Focal Deficits] : no focal deficits [No Palpable Adenopathy] : no palpable adenopathy [FreeTextEntry1] : Umbilical hernia (reducible).  Midline diastasis.

## 2021-03-18 ENCOUNTER — NON-APPOINTMENT (OUTPATIENT)
Age: 56
End: 2021-03-18

## 2021-03-25 ENCOUNTER — TRANSCRIPTION ENCOUNTER (OUTPATIENT)
Age: 56
End: 2021-03-25

## 2021-04-15 ENCOUNTER — APPOINTMENT (OUTPATIENT)
Dept: ENDOCRINOLOGY | Facility: CLINIC | Age: 56
End: 2021-04-15
Payer: COMMERCIAL

## 2021-04-15 VITALS
SYSTOLIC BLOOD PRESSURE: 127 MMHG | OXYGEN SATURATION: 97 % | TEMPERATURE: 97.2 F | WEIGHT: 189 LBS | HEIGHT: 69 IN | BODY MASS INDEX: 27.99 KG/M2 | DIASTOLIC BLOOD PRESSURE: 89 MMHG | HEART RATE: 98 BPM

## 2021-04-15 LAB
GLUCOSE BLDC GLUCOMTR-MCNC: 102
HBA1C MFR BLD HPLC: 6.2

## 2021-04-15 PROCEDURE — 99072 ADDL SUPL MATRL&STAF TM PHE: CPT

## 2021-04-15 PROCEDURE — 99213 OFFICE O/P EST LOW 20 MIN: CPT | Mod: 25

## 2021-04-15 PROCEDURE — 83036 HEMOGLOBIN GLYCOSYLATED A1C: CPT | Mod: NC,QW

## 2021-04-15 NOTE — REVIEW OF SYSTEMS
[Fatigue] : no fatigue [Decreased Appetite] : appetite not decreased [Dysphagia] : no dysphagia [Neck Pain] : no neck pain [Dysphonia] : no dysphonia [Nasal Congestion] : no nasal congestion [As Noted in HPI] : as noted in HPI [SOB on Exertion] : no shortness of breath on exertion [Nausea] : no nausea [Constipation] : no constipation [Vomiting] : no vomiting [Diarrhea] : no diarrhea [Headaches] : no headaches [Tremors] : no tremors [Depression] : no depression [FreeTextEntry6] : sneezing

## 2021-04-15 NOTE — HISTORY OF PRESENT ILLNESS
[FreeTextEntry1] : Patient is a 57 yo man here for diabetes follow up.\par \par Patient was told of having diabetes based on an OGTT in February 2021.  He previously had an A1c of 6.2% and was never informed of having prediabetes prior.  He changed PCP's in February who performed a 5-hour OGTT and at the 2 hour raúl, glucose was 287. We discussed nutritional modifications to cut back on sweets and bread/potatoes and he is here today for follow up.\par He has limited carbohydrates, cut back on breads but did have bagel yesterday.  Also decreased potato intake. Eating less sweets, stopped having coffee with sugar.\par Recently has had allergy-fits of sneezing.  Patient had a banana yesterday and a fit of sneezing.  Denies acid reflux.  NO time association.  States symptoms have been going on for quite some time.  \par Otherwise without symptoms\par He states he got labs done last week, unsure if he had an A1c

## 2021-04-15 NOTE — ASSESSMENT
[FreeTextEntry1] : Patient is a 55 yo man with prediabetes here for follow up.\par \par 1. Prediabetes\par -patient had a 5 hour OGTT and at 2 hours, glucose was > 200. Per ADA guidelines the diagnostic tests to diagnose diabetes include serum A1c, random glucose > 200 with symptoms, 2 hour 75 gram OGTT with 2 hour value > 200 and fasting serum glucose > 126 on two occasions\par -the 5 hour glucose tolerance test is not diagnostic, especially not noted how much glucose was ingested\par -he has made attempts to limits bagels and potatoes and has had successful weight loss of a few pounds\par -POCT glucose was 102 and POCT A1c was 6.2%\par -a serum A1c is necessary to confirm findings however he said he got blood work done just last week.  We will try to obtain results from lab. If no A1c was done, he will need one.  Again, diagnosis is based on serum A1c and not on POCT\par -nutritional counseling provided\par -referral slip for A1c given\par \par Follow up in 3 months, sooner if needed

## 2021-04-15 NOTE — PHYSICAL EXAM
[Alert] : alert [Well Nourished] : well nourished [No Acute Distress] : no acute distress [EOMI] : extra ocular movement intact [Normal Hearing] : hearing was normal [Thyroid Not Enlarged] : the thyroid was not enlarged [No Thyroid Nodules] : no palpable thyroid nodules [Normal Bowel Sounds] : normal bowel sounds [Not Tender] : non-tender [Soft] : abdomen soft [Normal Gait] : normal gait [No Motor Deficits] : the motor exam was normal [Normal Affect] : the affect was normal [Normal Insight/Judgement] : insight and judgment were intact [Normal Mood] : the mood was normal

## 2021-04-20 ENCOUNTER — NON-APPOINTMENT (OUTPATIENT)
Age: 56
End: 2021-04-20

## 2021-06-03 ENCOUNTER — NON-APPOINTMENT (OUTPATIENT)
Age: 56
End: 2021-06-03

## 2021-06-07 ENCOUNTER — APPOINTMENT (OUTPATIENT)
Dept: UROLOGY | Facility: CLINIC | Age: 56
End: 2021-06-07
Payer: COMMERCIAL

## 2021-06-07 VITALS
SYSTOLIC BLOOD PRESSURE: 121 MMHG | DIASTOLIC BLOOD PRESSURE: 84 MMHG | OXYGEN SATURATION: 98 % | HEART RATE: 93 BPM | TEMPERATURE: 98.2 F

## 2021-06-07 DIAGNOSIS — Z87.898 PERSONAL HISTORY OF OTHER SPECIFIED CONDITIONS: ICD-10-CM

## 2021-06-07 LAB
BILIRUB UR QL STRIP: NORMAL
CLARITY UR: CLEAR
COLLECTION METHOD: NORMAL
GLUCOSE UR-MCNC: 1000
HCG UR QL: 0.2 EU/DL
HGB UR QL STRIP.AUTO: NORMAL
KETONES UR-MCNC: NORMAL
LEUKOCYTE ESTERASE UR QL STRIP: NORMAL
NITRITE UR QL STRIP: NORMAL
PH UR STRIP: 6
PROT UR STRIP-MCNC: NORMAL
SP GR UR STRIP: 1.01

## 2021-06-07 PROCEDURE — 99215 OFFICE O/P EST HI 40 MIN: CPT

## 2021-06-07 PROCEDURE — 99072 ADDL SUPL MATRL&STAF TM PHE: CPT

## 2021-06-07 RX ORDER — EMPAGLIFLOZIN 25 MG/1
25 TABLET, FILM COATED ORAL
Refills: 0 | Status: ACTIVE | COMMUNITY

## 2021-06-07 NOTE — PHYSICAL EXAM
[General Appearance - Well Developed] : well developed [General Appearance - Well Nourished] : well nourished [Normal Appearance] : normal appearance [Well Groomed] : well groomed [General Appearance - In No Acute Distress] : no acute distress [] : no respiratory distress [Respiration, Rhythm And Depth] : normal respiratory rhythm and effort [Exaggerated Use Of Accessory Muscles For Inspiration] : no accessory muscle use [Oriented To Time, Place, And Person] : oriented to person, place, and time [Affect] : the affect was normal [Mood] : the mood was normal [Not Anxious] : not anxious [Normal Station and Gait] : the gait and station were normal for the patient's age [FreeTextEntry1] : Umbilical hernia (reducible).  Midline diastasis.

## 2021-06-07 NOTE — ADDENDUM
[FreeTextEntry1] : A portion of this note was written by [Bala Martinez] on 06/04/2021 acting as a scribe for Dr. Mcmahon. \par \par I have personally reviewed the chart and agree that the record accurately reflects my personal performance of the history, physical exam, assessment, and plan.

## 2021-06-07 NOTE — HISTORY OF PRESENT ILLNESS
[FreeTextEntry1] : Mr. LAYA DURAND comes in today for followup. He has increased his Viagra dose to 200mg, however, he is still unable to achieve an erection. He would like to discuss his options, including intracavernosal injections and penile prosthesis. \par \par Approximately one year ago he noted a penile curvature almost simultaneously with the onset of inability to achieve adequate erections and decreased libido. The penile curve is described as right dorsolateral, approximated at 40 degrees. He denies any trauma or acute psychosocial issues, although he is being treated for depression. He was previously prescribed Cialis (?dose) without a subjective response.  Unfortunately, the 200 mg Viagra dose resulted in only a "three-quarter" erection.\par \par From his general urologic history, Mr. Durand reports minimal lower urinary tract symptoms, including nocturia x 1. In 2012 he had one episode of urinary retention requiring catheterization (without an identifiable predisposing factor). \par \par Testosterone: 2/23/21--408; 1/28/21--509.0; \par \par PSAs: 2/23/21--1.69; 1/18/21--1.90; \par \par Scrotal sono: 3/10/21--Venous thrombosis of left varicocele.  Small right hydrocele and small right varicocele.\par

## 2021-06-07 NOTE — ASSESSMENT
[FreeTextEntry1] : I discussed the findings and options with . LAYA ROSARIO in detail.  \par \par I again discussed the options after having failed PDE 5 therapy, including intracavernosal injection therapy and a penile prosthesis.  I have provided him with the appropriate literature and instructions for the injections.  He will discuss this with his wife and decide if he wants to proceed.  For now he is not interested in a penile prosthesis.

## 2021-07-01 NOTE — CHART NOTE - NSCHARTNOTEFT_GEN_A_CORE
Patient arrived to Non-Invasive Cardiology Lab for Out Patient [facility] Procedure. Staff introduced to patient. Patient identifiers verified with Name and Date of Birth. Procedure verified with patient. Consent forms reviewed and signed by patient or authorized representative and verified. Allergies verified. Patient informed of procedure and plan of care. Questions answered with review. Patient on cardiac monitor, non-invasive blood pressure, SPO2 monitor. On RA. Patient is A&Ox3. Patient reports no complaints. Patient on stretcher, in low position, with side rails up. Patient instructed to call for assistance as needed. Family in waiting room.  Wife Whit Ruiz Screening Medical Evaluation  Patient Admitted from: [facility] admitting diagnosis: Bipolar affective disorder, current episode hypomanic    PAST MEDICAL & SURGICAL HISTORY:        Allergies    No Known Allergies    Intolerances        Social History:     FAMILY HISTORY:      MEDICATIONS  (STANDING):  gemfibrozil 600 milliGRAM(s) Oral two times a day    MEDICATIONS  (PRN):  LORazepam     Tablet 1 milliGRAM(s) Oral every 6 hours PRN anxiety/agitation  LORazepam   Injectable 1 milliGRAM(s) IntraMuscular once PRN Agitation      Vital Signs Last 24 Hrs  T(C): 36.7 (26 Apr 2019 20:46), Max: 36.7 (26 Apr 2019 18:21)  T(F): 98.1 (26 Apr 2019 20:46), Max: 98.1 (26 Apr 2019 18:21)  HR: --77  BP: -- 141/99  BP(mean): --  RR: --  SpO2: --  CAPILLARY BLOOD GLUCOSE            PHYSICAL EXAM:  GENERAL: NAD, well-developed  HEAD:  Atraumatic, Normocephalic  EYES: EOMI, PERRLA, conjunctiva and sclera clear  NECK: Supple, No JVD  CHEST/LUNG: Clear to auscultation bilaterally; No wheeze  HEART: Regular rate and rhythm; No murmurs, rubs, or gallops  ABDOMEN: Soft, Nontender, Nondistended; Bowel sounds present  EXTREMITIES:  2+ Peripheral Pulses, No clubbing, cyanosis, or edema  PSYCH: AAOx3  NEUROLOGY: non-focal  SKIN: In tact    LABS:                    RADIOLOGY & ADDITIONAL TESTS:    Assessment and Plan: 53 yo M with PMH of HLD is admitted to [facility] with a primary psychiatric diagnosis of Bipolar affective disorder, current episode hypomanic. The pt currently denies having any medical complaints such as chest pain, sob, abdominal pain, n/v/d/c, or any problems with urination or bowel movements. The rest of his screening physical is unremarkable.    1.Bipolar affective disorder, current episode hypomanic-Plan: continue with meds as per primary psychiatric team  2. HLD-Plan: continue with gemfibrozil Screening Medical Evaluation  Patient Admitted from: Crisis    [facility] admitting diagnosis: Bipolar affective disorder, current episode hypomanic    PAST MEDICAL & SURGICAL HISTORY:        Allergies    No Known Allergies    Intolerances        Social History:     FAMILY HISTORY:      MEDICATIONS  (STANDING):  gemfibrozil 600 milliGRAM(s) Oral two times a day    MEDICATIONS  (PRN):  LORazepam     Tablet 1 milliGRAM(s) Oral every 6 hours PRN anxiety/agitation  LORazepam   Injectable 1 milliGRAM(s) IntraMuscular once PRN Agitation      Vital Signs Last 24 Hrs  T(C): 36.7 (26 Apr 2019 20:46), Max: 36.7 (26 Apr 2019 18:21)  T(F): 98.1 (26 Apr 2019 20:46), Max: 98.1 (26 Apr 2019 18:21)  HR: --77  BP: -- 141/99  BP(mean): --  RR: --  SpO2: --  CAPILLARY BLOOD GLUCOSE            PHYSICAL EXAM:  GENERAL: NAD, well-developed  HEAD:  Atraumatic, Normocephalic  EYES: EOMI, PERRLA, conjunctiva and sclera clear  NECK: Supple, No JVD  CHEST/LUNG: Clear to auscultation bilaterally; No wheeze  HEART: Regular rate and rhythm; No murmurs, rubs, or gallops  ABDOMEN: Soft, Nontender, Nondistended; Bowel sounds present  EXTREMITIES:  2+ Peripheral Pulses, No clubbing, cyanosis, or edema  PSYCH: AAOx3  NEUROLOGY: non-focal  SKIN: In tact    LABS:                    RADIOLOGY & ADDITIONAL TESTS:    Assessment and Plan: 55 yo M with PMH of HLD is admitted to [facility] with a primary psychiatric diagnosis of Bipolar affective disorder, current episode hypomanic. The pt currently denies having any medical complaints such as chest pain, sob, abdominal pain, n/v/d/c, or any problems with urination or bowel movements. The rest of his screening physical is unremarkable.    1.Bipolar affective disorder, current episode hypomanic-Plan: continue with meds as per primary psychiatric team  2. HLD-Plan: continue with gemfibrozil

## 2021-07-26 ENCOUNTER — APPOINTMENT (OUTPATIENT)
Dept: ENDOCRINOLOGY | Facility: CLINIC | Age: 56
End: 2021-07-26

## 2022-05-13 ENCOUNTER — EMERGENCY (EMERGENCY)
Facility: HOSPITAL | Age: 57
LOS: 1 days | Discharge: HOME | End: 2022-05-13
Admitting: EMERGENCY MEDICINE
Payer: COMMERCIAL

## 2022-05-13 VITALS
SYSTOLIC BLOOD PRESSURE: 116 MMHG | RESPIRATION RATE: 18 BRPM | TEMPERATURE: 98 F | OXYGEN SATURATION: 96 % | WEIGHT: 169.98 LBS | DIASTOLIC BLOOD PRESSURE: 80 MMHG | HEIGHT: 69 IN | HEART RATE: 99 BPM

## 2022-05-13 PROCEDURE — 99283 EMERGENCY DEPT VISIT LOW MDM: CPT

## 2022-05-13 RX ORDER — IBUPROFEN 200 MG
600 TABLET ORAL ONCE
Refills: 0 | Status: COMPLETED | OUTPATIENT
Start: 2022-05-13 | End: 2022-05-13

## 2022-05-13 RX ORDER — IBUPROFEN 200 MG
1 TABLET ORAL
Qty: 15 | Refills: 0
Start: 2022-05-13 | End: 2022-05-17

## 2022-05-13 RX ADMIN — Medication 600 MILLIGRAM(S): at 18:31

## 2022-05-13 RX ADMIN — Medication 600 MILLIGRAM(S): at 18:25
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
## 2022-05-13 NOTE — ED PROVIDER NOTE - MUSCULOSKELETAL, MLM
Spine appears normal, range of motion is not limited, no muscle or joint tenderness; L elbow: swelling over olecranon bursa, pale erythema, no warmth, FROM, no tend, no pain w/supination or pronation, + light touch, no lymphangitis, no abscess
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
## 2022-05-13 NOTE — ED PROVIDER NOTE - PATIENT PORTAL LINK FT
You can access the FollowMyHealth Patient Portal offered by [facility] by registering at the following website: [email or web address]. By joining [employer]’s FollowMyHealth portal, you will also be able to view your health information using other applications (apps) compatible with our system.

## 2022-05-13 NOTE — ED ADULT NURSE NOTE - OBJECTIVE STATEMENT
57y male c/o abscess to L elbow. pt states he noticed the abscess yesterday.  warm, erythemas, fluctuant abscess noted to L elbow. FROM. hx of HLD. pt denies fever/chills, CP, SOB, n/v/d, headache, dizziness. No acute distress noted at this time.

## 2022-05-13 NOTE — ED PROVIDER NOTE - CARE PROVIDER_API CALL
John Pierce)  Orthopaedic Surgery  [address] [place] [address], [address]  [place] [address]  Phone: [phone]  Fax: [phone]  Follow Up Time:

## 2022-05-16 DIAGNOSIS — M79.89 OTHER SPECIFIED SOFT TISSUE DISORDERS: ICD-10-CM

## 2022-05-16 DIAGNOSIS — E78.5 HYPERLIPIDEMIA, UNSPECIFIED: ICD-10-CM

## 2022-05-16 DIAGNOSIS — M70.22 OLECRANON BURSITIS, LEFT ELBOW: ICD-10-CM

## 2022-07-14 ENCOUNTER — RX RENEWAL (OUTPATIENT)
Age: 57
End: 2022-07-14

## 2022-10-03 ENCOUNTER — NON-APPOINTMENT (OUTPATIENT)
Age: 57
End: 2022-10-03

## 2022-10-03 ENCOUNTER — APPOINTMENT (OUTPATIENT)
Dept: UROLOGY | Facility: CLINIC | Age: 57
End: 2022-10-03

## 2022-10-03 VITALS
OXYGEN SATURATION: 98 % | DIASTOLIC BLOOD PRESSURE: 57 MMHG | HEART RATE: 78 BPM | HEIGHT: 69 IN | WEIGHT: 167 LBS | BODY MASS INDEX: 24.73 KG/M2 | RESPIRATION RATE: 16 BRPM | TEMPERATURE: 97.2 F | SYSTOLIC BLOOD PRESSURE: 94 MMHG

## 2022-10-03 DIAGNOSIS — R73.03 PREDIABETES.: ICD-10-CM

## 2022-10-03 DIAGNOSIS — E03.9 HYPOTHYROIDISM, UNSPECIFIED: ICD-10-CM

## 2022-10-03 DIAGNOSIS — E11.9 TYPE 2 DIABETES MELLITUS W/OUT COMPLICATIONS: ICD-10-CM

## 2022-10-03 LAB
BILIRUB UR QL STRIP: NORMAL
CLARITY UR: CLEAR
COLLECTION METHOD: NORMAL
GLUCOSE UR-MCNC: 1000
HCG UR QL: 1 EU/DL
HGB UR QL STRIP.AUTO: NORMAL
KETONES UR-MCNC: NORMAL
LEUKOCYTE ESTERASE UR QL STRIP: NORMAL
NITRITE UR QL STRIP: NORMAL
PH UR STRIP: 7
PROT UR STRIP-MCNC: 100
SP GR UR STRIP: 1.01

## 2022-10-03 PROCEDURE — 81003 URINALYSIS AUTO W/O SCOPE: CPT | Mod: QW

## 2022-10-03 PROCEDURE — 99215 OFFICE O/P EST HI 40 MIN: CPT

## 2022-10-03 PROCEDURE — 76857 US EXAM PELVIC LIMITED: CPT

## 2022-10-03 RX ORDER — DESVENLAFAXINE 100 MG/1
100 TABLET, EXTENDED RELEASE ORAL
Refills: 0 | Status: ACTIVE | COMMUNITY

## 2022-10-03 RX ORDER — ALFUZOSIN HYDROCHLORIDE 10 MG/1
10 TABLET, EXTENDED RELEASE ORAL
Qty: 90 | Refills: 3 | Status: ACTIVE | COMMUNITY
Start: 2022-10-03 | End: 1900-01-01

## 2022-10-03 RX ORDER — LEVOTHYROXINE SODIUM 0.17 MG/1
TABLET ORAL
Refills: 0 | Status: ACTIVE | COMMUNITY

## 2022-10-03 NOTE — ADDENDUM
[FreeTextEntry1] : A portion of this note was written by [Bala Martinez] on 09/30/2022 acting as a scribe for Dr. Mcmahon. \par \par I have personally reviewed the chart and agree that the record accurately reflects my personal performance of the history, physical exam, assessment, and plan.

## 2022-10-03 NOTE — HISTORY OF PRESENT ILLNESS
[FreeTextEntry1] : Mr. LAYA DURAND comes in today for followup. He reports moderate lower urinary tract symptoms, including nocturia x 1. In 2012 he had one episode of urinary retention requiring catheterization (without an identifiable predisposing factor). He drinks 4-5 caffeinated beverages daily. \par IPSS: 21/35\par Sono (performed to assess bladder emptying): PVR 95cc, 54cc prostate \par \par Mr. Durand has a history of erectile dysfunction and has achieved a suboptimal response ("70") to Viagra 100 mg.  \par \par He also has Peyronie's disease with a 40 degree right dorsal lateral curve.  Apparently this does not prevent penetration. He denies any penile pain. \par \par Testosterone: 2/23/21--408; 1/28/21--509.0; \par \par PSAs: 2/23/21--1.69; 1/18/21--1.90; \par \par Scrotal sono: 3/10/21--Venous thrombosis of left varicocele.  Small right hydrocele and small right varicocele.\par

## 2022-10-03 NOTE — PHYSICAL EXAM
[General Appearance - Well Developed] : well developed [General Appearance - Well Nourished] : well nourished [Normal Appearance] : normal appearance [Well Groomed] : well groomed [General Appearance - In No Acute Distress] : no acute distress [] : no respiratory distress [Respiration, Rhythm And Depth] : normal respiratory rhythm and effort [Exaggerated Use Of Accessory Muscles For Inspiration] : no accessory muscle use [Oriented To Time, Place, And Person] : oriented to person, place, and time [Affect] : the affect was normal [Mood] : the mood was normal [Not Anxious] : not anxious [Normal Station and Gait] : the gait and station were normal for the patient's age [Abdomen Soft] : soft [Abdomen Mass (___ Cm)] : no abdominal mass palpated [Costovertebral Angle Tenderness] : no ~M costovertebral angle tenderness [Urethral Meatus] : meatus normal [Penis Abnormality] : normal uncircumcised penis [Urinary Bladder Findings] : the bladder was normal on palpation [Scrotum] : the scrotum was normal [Epididymis] : the epididymides were normal [Testes Tenderness] : no tenderness of the testes [Testes Mass (___cm)] : there were no testicular masses [Prostate Tenderness] : the prostate was not tender [No Prostate Nodules] : no prostate nodules [Skin Color & Pigmentation] : normal skin color and pigmentation [Heart Rate And Rhythm] : Heart rate and rhythm were normal [No Focal Deficits] : no focal deficits [No Palpable Adenopathy] : no palpable adenopathy [FreeTextEntry1] : Umbilical hernia (reducible).  Midline diastasis.  Weak left external inguinal ring.

## 2022-10-03 NOTE — ASSESSMENT
[FreeTextEntry1] : I discussed the findings and options with . LAYA DURAND in detail.  For the ED, he will retry the Viagra 100 to 200 mg under the right circumstances which I reviewed with him.  He is not interested in pursuing interventional options, including intracavernosal injection therapy and a penile prosthesis.  \par \par Have prescribed alfuzosin in an effort to improve his voiding symptoms.\par \par Regarding the microhematuria, a urine culture and cytology are pending. I advised him to repeat his urinalysis within one month.  If this, too shows blood, a more extensive evaluation should be performed (i.e.  CT abdomen pelvis and cystoscopy)\par \par A PSA is pending and we will call him with the results. \par \par Providing there are no additional problems, look forward to seeing Mr. Durand in 1 year (bladder sono, PSA).

## 2022-10-03 NOTE — LETTER BODY
[Dear  ___] : Dear  [unfilled], [Consult Letter:] : I had the pleasure of evaluating your patient, [unfilled]. [Please see my note below.] : Please see my note below. [Consult Closing:] : Thank you very much for allowing me to participate in the care of this patient.  If you have any questions, please do not hesitate to contact me. [Sincerely,] : Sincerely, [DrFreddy  ___] : Dr. HERNÁNDEZ [FreeTextEntry3] : Arnoldo Mcmahon MD, FACS

## 2022-10-04 LAB — PSA SERPL-MCNC: 1.89 NG/ML

## 2022-10-05 ENCOUNTER — NON-APPOINTMENT (OUTPATIENT)
Age: 57
End: 2022-10-05

## 2022-10-05 LAB
BACTERIA UR CULT: NORMAL
URINE CYTOLOGY: NORMAL

## 2022-11-14 ENCOUNTER — APPOINTMENT (OUTPATIENT)
Dept: UROLOGY | Facility: CLINIC | Age: 57
End: 2022-11-14

## 2022-11-14 VITALS
DIASTOLIC BLOOD PRESSURE: 75 MMHG | OXYGEN SATURATION: 97 % | HEIGHT: 60 IN | HEART RATE: 86 BPM | SYSTOLIC BLOOD PRESSURE: 119 MMHG | TEMPERATURE: 97.8 F

## 2022-11-14 LAB
BILIRUB UR QL STRIP: NORMAL
CLARITY UR: CLEAR
COLLECTION METHOD: NORMAL
GLUCOSE UR-MCNC: 1000
HCG UR QL: 0.2 EU/DL
HGB UR QL STRIP.AUTO: NORMAL
KETONES UR-MCNC: NORMAL
LEUKOCYTE ESTERASE UR QL STRIP: NORMAL
NITRITE UR QL STRIP: NORMAL
PH UR STRIP: 5.5
PROT UR STRIP-MCNC: NORMAL
SP GR UR STRIP: 1.01

## 2022-11-14 PROCEDURE — 99215 OFFICE O/P EST HI 40 MIN: CPT

## 2022-11-14 PROCEDURE — 81002 URINALYSIS NONAUTO W/O SCOPE: CPT

## 2022-11-14 RX ORDER — VORICONAZOLE 200 MG/1
200 TABLET ORAL
Qty: 44 | Refills: 0 | Status: ACTIVE | COMMUNITY
Start: 2022-02-22

## 2022-11-14 RX ORDER — BACLOFEN 10 MG/1
10 TABLET ORAL
Qty: 60 | Refills: 0 | Status: ACTIVE | COMMUNITY
Start: 2022-10-19

## 2022-11-14 RX ORDER — ROSUVASTATIN CALCIUM 40 MG/1
40 TABLET, FILM COATED ORAL
Qty: 30 | Refills: 0 | Status: ACTIVE | COMMUNITY
Start: 2022-08-24

## 2022-11-14 RX ORDER — SEMAGLUTIDE 1.34 MG/ML
4 INJECTION, SOLUTION SUBCUTANEOUS
Qty: 3 | Refills: 0 | Status: ACTIVE | COMMUNITY
Start: 2022-08-24

## 2022-11-14 RX ORDER — ERGOCALCIFEROL 1.25 MG/1
1.25 MG CAPSULE, LIQUID FILLED ORAL
Qty: 4 | Refills: 0 | Status: ACTIVE | COMMUNITY
Start: 2022-11-08

## 2022-11-14 RX ORDER — TRIAZOLAM 0.25 MG/1
0.25 TABLET ORAL
Qty: 60 | Refills: 0 | Status: ACTIVE | COMMUNITY
Start: 2022-11-02

## 2022-11-14 RX ORDER — DESVENLAFAXINE 100 MG/1
100 TABLET, EXTENDED RELEASE ORAL
Qty: 30 | Refills: 0 | Status: ACTIVE | COMMUNITY
Start: 2022-10-19

## 2022-11-14 RX ORDER — CARBAMAZEPINE 200 MG/1
200 TABLET, EXTENDED RELEASE ORAL
Qty: 30 | Refills: 0 | Status: ACTIVE | COMMUNITY
Start: 2022-10-28

## 2022-11-14 RX ORDER — DAPAGLIFLOZIN 10 MG/1
10 TABLET, FILM COATED ORAL
Qty: 30 | Refills: 0 | Status: ACTIVE | COMMUNITY
Start: 2022-06-27

## 2022-11-14 NOTE — ASSESSMENT
[FreeTextEntry1] : I discussed the findings and options with Mr. LAYA DURAND in detail.  I advised that he proceed with a CT abd/pelvis (-/+) and return for a cystoscopy to assess the persistent microhematuria.  \par \par Mr. Durand will continue on the alfuzosin for his voiding symptoms; he does not want any additional treatment for this.\par \par Similarly, he will forego invasive options for the ED and will continue on either 100 or 150mg Viagra prn. \par \par

## 2022-11-14 NOTE — PHYSICAL EXAM
[General Appearance - Well Developed] : well developed [General Appearance - Well Nourished] : well nourished [Normal Appearance] : normal appearance [Well Groomed] : well groomed [General Appearance - In No Acute Distress] : no acute distress [Abdomen Soft] : soft [Abdomen Mass (___ Cm)] : no abdominal mass palpated [Costovertebral Angle Tenderness] : no ~M costovertebral angle tenderness [Urethral Meatus] : meatus normal [Penis Abnormality] : normal uncircumcised penis [Urinary Bladder Findings] : the bladder was normal on palpation [Scrotum] : the scrotum was normal [Epididymis] : the epididymides were normal [Testes Tenderness] : no tenderness of the testes [Testes Mass (___cm)] : there were no testicular masses [Prostate Tenderness] : the prostate was not tender [No Prostate Nodules] : no prostate nodules [Skin Color & Pigmentation] : normal skin color and pigmentation [Heart Rate And Rhythm] : Heart rate and rhythm were normal [] : no respiratory distress [Respiration, Rhythm And Depth] : normal respiratory rhythm and effort [Exaggerated Use Of Accessory Muscles For Inspiration] : no accessory muscle use [Oriented To Time, Place, And Person] : oriented to person, place, and time [Affect] : the affect was normal [Mood] : the mood was normal [Not Anxious] : not anxious [Normal Station and Gait] : the gait and station were normal for the patient's age [No Focal Deficits] : no focal deficits [No Palpable Adenopathy] : no palpable adenopathy [FreeTextEntry1] : Umbilical hernia (reducible).  Midline diastasis.  Weak left external inguinal ring.

## 2022-11-14 NOTE — ADDENDUM
[FreeTextEntry1] : A portion of this note was written by [Bala Martinez] on 11/13/2022 acting as a scribe for Dr. Mcmahon. \par \par I have personally reviewed the chart and agree that the record accurately reflects my personal performance of the history, physical exam, assessment, and plan.

## 2022-11-14 NOTE — HISTORY OF PRESENT ILLNESS
[FreeTextEntry1] : Mr. LAYA DURAND comes in today for followup. He reports moderate lower urinary tract symptoms, including nocturia x 1. In 2012 he had one episode of urinary retention requiring catheterization (without an identifiable predisposing factor). He drinks 4-5 caffeinated beverages daily. Mr. Durand is continuing on alfuzosin 10mg daily. \par IPSS: 27/35\par \par Mr. Durand has a history of erectile dysfunction and has achieved a suboptimal response ("70") with Viagra 100 mg.  He does not want to pursue additional options.\par \par He also has Peyronie's disease with a 40 degree right dorsal lateral curve.  Apparently this does not prevent penetration. He denies any penile pain. \par \par Testosterone: 2/23/21--408; 1/28/21--509.0; \par \par PSAs: 10/4/22--1.89; 2/23/21--1.69; 1/18/21--1.90; \par \par Urine cyt:  10/3/22--Neg\par \par Scrotal sono: 3/10/21--Venous thrombosis of left varicocele.  Small right hydrocele and small right varicocele.\par

## 2022-11-14 NOTE — LETTER BODY
[Dear  ___] : Dear  [unfilled], [Consult Letter:] : I had the pleasure of evaluating your patient, [unfilled]. [Please see my note below.] : Please see my note below. [Sincerely,] : Sincerely, [Consult Closing:] : Thank you very much for allowing me to participate in the care of this patient.  If you have any questions, please do not hesitate to contact me. [DrFreddy  ___] : Dr. HERNÁNDEZ [FreeTextEntry3] : Arnoldo Mcmahon MD, FACS

## 2022-11-16 LAB — BACTERIA UR CULT: NORMAL

## 2022-11-18 LAB — URINE CYTOLOGY: NORMAL

## 2022-12-19 ENCOUNTER — APPOINTMENT (OUTPATIENT)
Dept: UROLOGY | Facility: CLINIC | Age: 57
End: 2022-12-19

## 2023-01-13 ENCOUNTER — APPOINTMENT (OUTPATIENT)
Dept: UROLOGY | Facility: CLINIC | Age: 58
End: 2023-01-13
Payer: COMMERCIAL

## 2023-01-13 VITALS
HEART RATE: 81 BPM | TEMPERATURE: 97.2 F | OXYGEN SATURATION: 97 % | SYSTOLIC BLOOD PRESSURE: 129 MMHG | DIASTOLIC BLOOD PRESSURE: 83 MMHG

## 2023-01-13 LAB
BILIRUB UR QL STRIP: NORMAL
CLARITY UR: CLEAR
COLLECTION METHOD: NORMAL
GLUCOSE UR-MCNC: NORMAL
HCG UR QL: 0.2 EU/DL
HGB UR QL STRIP.AUTO: NORMAL
KETONES UR-MCNC: NORMAL
LEUKOCYTE ESTERASE UR QL STRIP: NORMAL
NITRITE UR QL STRIP: NORMAL
PH UR STRIP: 5.5
PROT UR STRIP-MCNC: NORMAL
SP GR UR STRIP: 1.02

## 2023-01-13 PROCEDURE — 52000 CYSTOURETHROSCOPY: CPT

## 2023-01-13 PROCEDURE — 81003 URINALYSIS AUTO W/O SCOPE: CPT | Mod: QW

## 2023-01-13 NOTE — ASSESSMENT
[FreeTextEntry1] : I discussed the findings and options with Mr. LAYA DURAND in detail and reviewed the CT findings.\par Fortunately, today's cystoscopy did not identify any significant lower urinary tract pathology.\par \par Providing there are no new problems, look forward to seeing Mr. Durand in 1 year (PSA, sonogram).  In the interim he will continue on the alfuzosin and Viagra for his voiding symptoms and erectile dysfunction, respectively.\par \par

## 2023-01-13 NOTE — PHYSICAL EXAM
[FreeTextEntry1] : Umbilical hernia (reducible).  Midline diastasis.  Weak left external inguinal ring.

## 2023-01-13 NOTE — ADDENDUM
[FreeTextEntry1] : A portion of this note was written by [Bala Martinez] on 12/16/2022 acting as a scribe for Dr. Mcmahon. \par \par I have personally reviewed the chart and agree that the record accurately reflects my personal performance of the history, physical exam, assessment, and plan.

## 2023-01-13 NOTE — HISTORY OF PRESENT ILLNESS
[FreeTextEntry1] : Mr. LAYA DURAND comes in today for followup, including a scheduled cystoscopy to evaluate persistent asymptomatic microhematuria. \par \par Mr. Durand reports moderate lower urinary tract symptoms, including nocturia x 1. In 2012 he had one episode of urinary retention requiring catheterization (without an identifiable predisposing factor). He drinks 4-5 caffeinated beverages daily. He is continuing on alfuzosin 10mg daily. \par IPSS: 10/35\par \par Mr. Durand has a history of erectile dysfunction and has achieved a suboptimal response ("70") with Viagra 100 mg.  He does not want to pursue additional options.\par \par He also has Peyronie's disease with a 40 degree right dorsal lateral curve.  Apparently this does not prevent penetration. He denies any penile pain. \par \par Testosterone: 2/23/21--408; 1/28/21--509.0; \par \par PSAs: 10/4/22--1.89; 2/23/21--1.69; 1/18/21--1.90; \par \par Urine cyt:  10/3/22--Neg\par \par Scrotal sono: 3/10/21--Venous thrombosis of left varicocele.  Small right hydrocele and small right varicocele.\par \par CT: 11/28/22--No urinary tract calculi, hydronephrosis, or enhancing mass. 2.1cm exophytic right upper pole renal cyst with layering milk of calcium. Duplicated right renal collecting system with a duplicated right ureter. \par

## 2023-01-16 LAB — BACTERIA UR CULT: NORMAL

## 2023-01-20 ENCOUNTER — RX RENEWAL (OUTPATIENT)
Age: 58
End: 2023-01-20

## 2023-01-20 RX ORDER — SILDENAFIL 100 MG/1
100 TABLET, FILM COATED ORAL
Qty: 10 | Refills: 6 | Status: ACTIVE | COMMUNITY
Start: 2022-07-14 | End: 1900-01-01

## 2023-02-02 ENCOUNTER — APPOINTMENT (OUTPATIENT)
Dept: UROLOGY | Facility: CLINIC | Age: 58
End: 2023-02-02

## 2023-11-29 ENCOUNTER — EMERGENCY (EMERGENCY)
Facility: HOSPITAL | Age: 58
LOS: 1 days | Discharge: HOME | End: 2023-11-29
Attending: EMERGENCY MEDICINE | Admitting: EMERGENCY MEDICINE
Payer: MEDICARE

## 2023-11-29 VITALS
TEMPERATURE: 98 F | HEIGHT: 69 IN | HEART RATE: 85 BPM | SYSTOLIC BLOOD PRESSURE: 161 MMHG | DIASTOLIC BLOOD PRESSURE: 100 MMHG | OXYGEN SATURATION: 96 % | WEIGHT: 179.02 LBS | RESPIRATION RATE: 20 BRPM

## 2023-11-29 VITALS
HEART RATE: 71 BPM | SYSTOLIC BLOOD PRESSURE: 160 MMHG | DIASTOLIC BLOOD PRESSURE: 90 MMHG | RESPIRATION RATE: 18 BRPM | TEMPERATURE: 98 F | OXYGEN SATURATION: 96 %
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
## 2023-11-29 DIAGNOSIS — E78.5 HYPERLIPIDEMIA, UNSPECIFIED: ICD-10-CM

## 2023-11-29 DIAGNOSIS — S80.12XA CONTUSION OF LEFT LOWER LEG, INITIAL ENCOUNTER: ICD-10-CM
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
## 2023-11-29 DIAGNOSIS — M79.605 PAIN IN LEFT LEG: ICD-10-CM

## 2023-11-29 DIAGNOSIS — Z91.148 PATIENT'S OTHER NONCOMPLIANCE WITH MEDICATION REGIMEN FOR OTHER REASON: ICD-10-CM

## 2023-11-29 DIAGNOSIS — I10 ESSENTIAL (PRIMARY) HYPERTENSION: ICD-10-CM

## 2023-11-29 DIAGNOSIS — Z86.39 PERSONAL HISTORY OF OTHER ENDOCRINE, NUTRITIONAL AND METABOLIC DISEASE: ICD-10-CM

## 2023-11-29 LAB
ANION GAP SERPL CALC-SCNC: 12 MMOL/L — SIGNIFICANT CHANGE UP (ref 5–17)
ANION GAP SERPL CALC-SCNC: 12 MMOL/L — SIGNIFICANT CHANGE UP (ref 5–17)
APTT BLD: 35.9 SEC — HIGH (ref 24.5–35.6)
APTT BLD: 35.9 SEC — HIGH (ref 24.5–35.6)
BASOPHILS # BLD AUTO: 0.03 K/UL — SIGNIFICANT CHANGE UP (ref 0–0.2)
BASOPHILS # BLD AUTO: 0.03 K/UL — SIGNIFICANT CHANGE UP (ref 0–0.2)
BASOPHILS NFR BLD AUTO: 0.5 % — SIGNIFICANT CHANGE UP (ref 0–2)
BASOPHILS NFR BLD AUTO: 0.5 % — SIGNIFICANT CHANGE UP (ref 0–2)
BUN SERPL-MCNC: 17 MG/DL — SIGNIFICANT CHANGE UP (ref 7–23)
BUN SERPL-MCNC: 17 MG/DL — SIGNIFICANT CHANGE UP (ref 7–23)
CALCIUM SERPL-MCNC: 9.4 MG/DL — SIGNIFICANT CHANGE UP (ref 8.4–10.5)
CALCIUM SERPL-MCNC: 9.4 MG/DL — SIGNIFICANT CHANGE UP (ref 8.4–10.5)
CHLORIDE SERPL-SCNC: 102 MMOL/L — SIGNIFICANT CHANGE UP (ref 96–108)
CHLORIDE SERPL-SCNC: 102 MMOL/L — SIGNIFICANT CHANGE UP (ref 96–108)
CO2 SERPL-SCNC: 24 MMOL/L — SIGNIFICANT CHANGE UP (ref 22–31)
CO2 SERPL-SCNC: 24 MMOL/L — SIGNIFICANT CHANGE UP (ref 22–31)
CREAT SERPL-MCNC: 0.78 MG/DL — SIGNIFICANT CHANGE UP (ref 0.5–1.3)
CREAT SERPL-MCNC: 0.78 MG/DL — SIGNIFICANT CHANGE UP (ref 0.5–1.3)
EGFR: 102 ML/MIN/1.73M2 — SIGNIFICANT CHANGE UP
EGFR: 102 ML/MIN/1.73M2 — SIGNIFICANT CHANGE UP
EOSINOPHIL # BLD AUTO: 0.03 K/UL — SIGNIFICANT CHANGE UP (ref 0–0.5)
EOSINOPHIL # BLD AUTO: 0.03 K/UL — SIGNIFICANT CHANGE UP (ref 0–0.5)
EOSINOPHIL NFR BLD AUTO: 0.5 % — SIGNIFICANT CHANGE UP (ref 0–6)
EOSINOPHIL NFR BLD AUTO: 0.5 % — SIGNIFICANT CHANGE UP (ref 0–6)
GLUCOSE SERPL-MCNC: 119 MG/DL — HIGH (ref 70–99)
GLUCOSE SERPL-MCNC: 119 MG/DL — HIGH (ref 70–99)
HCT VFR BLD CALC: 43.4 % — SIGNIFICANT CHANGE UP (ref 39–50)
HCT VFR BLD CALC: 43.4 % — SIGNIFICANT CHANGE UP (ref 39–50)
HGB BLD-MCNC: 14.5 G/DL — SIGNIFICANT CHANGE UP (ref 13–17)
HGB BLD-MCNC: 14.5 G/DL — SIGNIFICANT CHANGE UP (ref 13–17)
IMM GRANULOCYTES NFR BLD AUTO: 0.2 % — SIGNIFICANT CHANGE UP (ref 0–0.9)
IMM GRANULOCYTES NFR BLD AUTO: 0.2 % — SIGNIFICANT CHANGE UP (ref 0–0.9)
INR BLD: 1 — SIGNIFICANT CHANGE UP (ref 0.85–1.18)
INR BLD: 1 — SIGNIFICANT CHANGE UP (ref 0.85–1.18)
LYMPHOCYTES # BLD AUTO: 1.04 K/UL — SIGNIFICANT CHANGE UP (ref 1–3.3)
LYMPHOCYTES # BLD AUTO: 1.04 K/UL — SIGNIFICANT CHANGE UP (ref 1–3.3)
LYMPHOCYTES # BLD AUTO: 18.4 % — SIGNIFICANT CHANGE UP (ref 13–44)
LYMPHOCYTES # BLD AUTO: 18.4 % — SIGNIFICANT CHANGE UP (ref 13–44)
MCHC RBC-ENTMCNC: 30.3 PG — SIGNIFICANT CHANGE UP (ref 27–34)
MCHC RBC-ENTMCNC: 30.3 PG — SIGNIFICANT CHANGE UP (ref 27–34)
MCHC RBC-ENTMCNC: 33.4 GM/DL — SIGNIFICANT CHANGE UP (ref 32–36)
MCHC RBC-ENTMCNC: 33.4 GM/DL — SIGNIFICANT CHANGE UP (ref 32–36)
MCV RBC AUTO: 90.6 FL — SIGNIFICANT CHANGE UP (ref 80–100)
MCV RBC AUTO: 90.6 FL — SIGNIFICANT CHANGE UP (ref 80–100)
MONOCYTES # BLD AUTO: 0.44 K/UL — SIGNIFICANT CHANGE UP (ref 0–0.9)
MONOCYTES # BLD AUTO: 0.44 K/UL — SIGNIFICANT CHANGE UP (ref 0–0.9)
MONOCYTES NFR BLD AUTO: 7.8 % — SIGNIFICANT CHANGE UP (ref 2–14)
MONOCYTES NFR BLD AUTO: 7.8 % — SIGNIFICANT CHANGE UP (ref 2–14)
NEUTROPHILS # BLD AUTO: 4.1 K/UL — SIGNIFICANT CHANGE UP (ref 1.8–7.4)
NEUTROPHILS # BLD AUTO: 4.1 K/UL — SIGNIFICANT CHANGE UP (ref 1.8–7.4)
NEUTROPHILS NFR BLD AUTO: 72.6 % — SIGNIFICANT CHANGE UP (ref 43–77)
NEUTROPHILS NFR BLD AUTO: 72.6 % — SIGNIFICANT CHANGE UP (ref 43–77)
NRBC # BLD: 0 /100 WBCS — SIGNIFICANT CHANGE UP (ref 0–0)
NRBC # BLD: 0 /100 WBCS — SIGNIFICANT CHANGE UP (ref 0–0)
PLATELET # BLD AUTO: 188 K/UL — SIGNIFICANT CHANGE UP (ref 150–400)
PLATELET # BLD AUTO: 188 K/UL — SIGNIFICANT CHANGE UP (ref 150–400)
POTASSIUM SERPL-MCNC: 4.1 MMOL/L — SIGNIFICANT CHANGE UP (ref 3.5–5.3)
POTASSIUM SERPL-MCNC: 4.1 MMOL/L — SIGNIFICANT CHANGE UP (ref 3.5–5.3)
POTASSIUM SERPL-SCNC: 4.1 MMOL/L — SIGNIFICANT CHANGE UP (ref 3.5–5.3)
POTASSIUM SERPL-SCNC: 4.1 MMOL/L — SIGNIFICANT CHANGE UP (ref 3.5–5.3)
PROTHROM AB SERPL-ACNC: 11.4 SEC — SIGNIFICANT CHANGE UP (ref 9.5–13)
PROTHROM AB SERPL-ACNC: 11.4 SEC — SIGNIFICANT CHANGE UP (ref 9.5–13)
RBC # BLD: 4.79 M/UL — SIGNIFICANT CHANGE UP (ref 4.2–5.8)
RBC # BLD: 4.79 M/UL — SIGNIFICANT CHANGE UP (ref 4.2–5.8)
RBC # FLD: 12.6 % — SIGNIFICANT CHANGE UP (ref 10.3–14.5)
RBC # FLD: 12.6 % — SIGNIFICANT CHANGE UP (ref 10.3–14.5)
SODIUM SERPL-SCNC: 138 MMOL/L — SIGNIFICANT CHANGE UP (ref 135–145)
SODIUM SERPL-SCNC: 138 MMOL/L — SIGNIFICANT CHANGE UP (ref 135–145)
WBC # BLD: 5.65 K/UL — SIGNIFICANT CHANGE UP (ref 3.8–10.5)
WBC # BLD: 5.65 K/UL — SIGNIFICANT CHANGE UP (ref 3.8–10.5)
WBC # FLD AUTO: 5.65 K/UL — SIGNIFICANT CHANGE UP (ref 3.8–10.5)
WBC # FLD AUTO: 5.65 K/UL — SIGNIFICANT CHANGE UP (ref 3.8–10.5)

## 2023-11-29 PROCEDURE — 80048 BASIC METABOLIC PNL TOTAL CA: CPT

## 2023-11-29 PROCEDURE — 36415 COLL VENOUS BLD VENIPUNCTURE: CPT

## 2023-11-29 PROCEDURE — 85025 COMPLETE CBC W/AUTO DIFF WBC: CPT

## 2023-11-29 PROCEDURE — 85610 PROTHROMBIN TIME: CPT

## 2023-11-29 PROCEDURE — 99284 EMERGENCY DEPT VISIT MOD MDM: CPT | Mod: 25

## 2023-11-29 PROCEDURE — 85730 THROMBOPLASTIN TIME PARTIAL: CPT

## 2023-11-29 PROCEDURE — 93971 EXTREMITY STUDY: CPT | Mod: 26,LT

## 2023-11-29 PROCEDURE — 99284 EMERGENCY DEPT VISIT MOD MDM: CPT | Mod: FS

## 2023-11-29 PROCEDURE — 93971 EXTREMITY STUDY: CPT

## 2023-11-29 NOTE — ED ADULT TRIAGE NOTE - CHIEF COMPLAINT QUOTE
Pt presents to ED here for L lower leg pain and swelling after a flight last night from [place]. Pt A&Ox4, conversive in full sentences. Pt has hx of HTN and depression. Pt denies numbness or tingling. Pt A&ox4, conversive in full sentences and ambulates. Denies cp or sob.

## 2023-11-29 NOTE — ED PROVIDER NOTE - NSFOLLOWUPINSTRUCTIONS_ED_ALL_ED_FT
Please follow up with primary care doctor as scheduled.     Your blood pressure is elevated and you need to discuss restarting medications    Take tylenol 650mg or motrin 400-800mg as needed every 4-6 hours for pain.   REST- Rest your hurting/injured joint or extremity to decrease pain and swelling for 24-48 hours    ICE- Apply ice to area of pain to decreased inflammation and pain, put towel/barrier between ice and skin. You can keep ice on for 20 minutes at a time 4-8 times daily   COMPRESSION- Wear ace wrap or brace for support to reduce swelling.  Make sure not to wrap too tight, loosen if skin feeling numb/tingling or skin turns blue   ELEVATION- Elevate hurting/injured area 6 or more inches about level of heart to decrease swelling/inflammation.  Use pillow under joint to elevate area    Hematoma  A hematoma is a collection of blood under the skin, in an organ, in a body space, in a joint space, or in other tissue. The blood can thicken (clot) to form a lump that you can see and feel. The lump is often firm and may become sore and tender. Most hematomas get better in a few days to weeks. However, some hematomas may be serious and require medical care. Hematomas can range from very small to very large.    What are the causes?  This condition is caused by:  A blunt or penetrating injury.  Leakage from a blood vessel under the skin.  Some medical procedures, including surgeries, such as oral surgery, face lifts, and surgeries on the joints.  Some medical conditions that cause bleeding or bruising. There may be multiple hematomas that appear in different areas of the body.  What increases the risk?  You are more likely to develop this condition if:  You are an older adult.  You use blood thinners.  You regularly use NSAIDs, such as ibuprofen, for pain.  You play contact sports.  What are the signs or symptoms?  Comparison of a normal ankle and an ankle that is swollen and bruised.  Symptoms of this condition depend on where the hematoma is located.    Common symptoms of a hematoma that is under the skin include:  A firm lump on the body.  Pain and tenderness in the area.  Bruising. Blue, dark blue, purple-red, or yellowish skin (discoloration) may appear at the site of the hematoma if the hematoma is close to the surface of the skin.  Common symptoms of a hematoma that is deep in the tissues or body spaces may be less obvious. They include:  A collection of blood in the stomach (intra-abdominal hematoma). This may cause pain in the abdomen, weakness, fainting, and shortness of breath.  A collection of blood in the head (intracranial hematoma). This may cause a headache or symptoms such as weakness, trouble speaking or understanding, or a change in consciousness.  How is this diagnosed?  This condition is diagnosed based on:  Your medical history.  A physical exam.  Imaging tests, such as an ultrasound or CT scan. These may be needed if your health care provider suspects a hematoma in deeper tissues or body spaces.  Blood tests. These may be needed if your health care provider believes that the hematoma is caused by a medical condition.  How is this treated?  Treatment for this condition depends on the cause, size, and location of the hematoma. Treatment may include:  Doing nothing. The majority of hematomas do not need treatment as many of them go away on their own.  Surgery or close monitoring. This may be needed for large hematomas or hematomas that affect vital organs.  Medicines. Medicines may be given if there is an underlying medical cause for the hematoma.  Follow these instructions at home:  Managing pain, stiffness, and swelling    Bag of ice on a towel on the skin.  If directed, put ice on the injured area. To do this:  Put ice in a plastic bag.  Place a towel between your skin and the bag.  Leave the ice on for 20 minutes, 2–3 times a day for the first couple of days.  If your skin turns bright red, remove the ice right away to prevent skin damage. The risk of skin damage is higher if you cannot feel pain, heat, or cold.  If directed, apply heat to the affected area as often as told by your health care provider. Use the heat source that your health care provider recommends, such as a moist heat pack or a heating pad.  Place a towel between your skin and the heat source.  Leave the heat on for 20–30 minutes.  If your skin turns bright red, remove the heat right away to prevent burns. The risk of burns is higher if you cannot feel pain, heat, or cold.  Raise (elevate) the injured area above the level of your heart while you are sitting or lying down.  If directed, wrap the affected area with an elastic bandage. The bandage applies pressure (compression) to the area, which may help to reduce swelling and promote healing. Do not wrap the bandage too tightly around the affected area.  If your hematoma is on a leg or foot (lower extremity) and is painful, your health care provider may recommend crutches. Use them as told by your health care provider.  General instructions    Take over-the-counter and prescription medicines only as told by your health care provider.  Rest the injured area as directed by your health care provider.  Keep all follow-up visits. Your health care provider may want to see how your hematoma is progressing with treatment.  Contact a health care provider if:  You have a fever.  The swelling or discoloration gets worse.  You develop more hematomas.  Your pain is worse or your pain is not controlled with medicine.  Your skin over the hematoma breaks or starts bleeding.  Get help right away if:  Your hematoma is in your chest or abdomen and you have weakness, shortness of breath, or a change in consciousness.  You have a hematoma on your scalp that is caused by a fall or injury, and you also have:  A headache that gets worse.  Trouble speaking or understanding speech.  Weakness.  A change in alertness or consciousness.  These symptoms may be an emergency. Get help right away. Call 911.  Do not wait to see if the symptoms will go away.  Do not drive yourself to the hospital.  This information is not intended to replace advice given to you by your health care provider. Make sure you discuss any questions you have with your health care provider.

## 2023-11-29 NOTE — ED ADULT NURSE NOTE - NSHOSCREENINGQ1_ED_ALL_ED
FAMILY HISTORY:  FH: hypertension, ~ mother    Mother  Still living? Unknown  Family history of sickle cell trait, Age at diagnosis: Age Unknown    
No

## 2023-11-29 NOTE — ED PROVIDER NOTE - OBJECTIVE STATEMENT
58 M pmh htn, hypothyroid, hld p/w LLE pain/swelling x today.  pt reports today he noted swelling/pain to medial aspect L calf and bruising to area- denies any trauma.  elevated and improved somewhat.  no prior h/o VTE.  pt was on 7 hr flight from [place], arrived last night.   pt admits not compliant w/ bp meds.  denies f/c, headache, dizziness, chest pain, sob, palpitations, LYNN, abd pain, nvd, numbness/weakness, paresthesias, trauma/fall, exogenous hormones

## 2023-11-29 NOTE — ED PROVIDER NOTE - PHYSICAL EXAMINATION
Vitals reviewed  Gen: well appearing, nad, speaking in full sentences  Skin: wwp, no rash/lesions  HEENT: ncat, eomi, mmm  CV: rrr, no audible m/r/g  Resp: symmetrical expansion, ctab, no w/r/r  LLE: + swelling/ecchymosis/palpable cord medial calf - no fluctuance or erythema/streaking, DP/PT Pulses 2+, SILT, cap refill 3sec, FROM all joints, no joint ttp  Neuro: alert/oriented, no focal deficits, steady gait

## 2023-11-29 NOTE — ED PROVIDER NOTE - PATIENT PORTAL LINK FT
You can access the FollowMyHealth Patient Portal offered by [facility] by registering at the following website: [email or web address]. By joining [employer]’s FollowMyHealth portal, you will also be able to view your health information using other applications (apps) compatible with our system.

## 2023-11-29 NOTE — ED PROVIDER NOTE - ATTENDING APP SHARED VISIT CONTRIBUTION OF CARE
58 M pmh htn, hypothyroid, hld p/w LLE pain/swelling x today.  pt reports today he noted swelling/pain to medial aspect L calf and bruising to area- denies any trauma.  elevated and improved somewhat.  no prior h/o VTE.  pt was on 7 hr flight from [place], arrived last night.   pt admits not compliant w/ bp meds.  denies f/c, headache, dizziness, chest pain, sob, palpitations, LYNN, abd pain, nvd, numbness/weakness, paresthesias, trauma/fall, exogenous hormones    on exam bp elevated (noncompliant w/ meds), lungs ctab, hrrr, LLE: + swelling/ecchymosis/palpable cord medial calf - no fluctuance or erythema/streaking, DP/PT Pulses 2+, SILT, cap refill 3sec, FROM all joints, no joint ttp.  r/o dvt, ?superficial thrombosis.  will obtain labs, doppler LLE    labs wnl, sonogram shows no dvt, + superficial hematoma.  pt w/ asymptomatic htn, has pmd f/u this week and will discuss restarting meds.  recommend RICE for hematoma.  discussed strict return parameters    Agree with above note as documented by PA.  I was available as the supervising attending during patient's ER evaluation.    Pt well appearing - lower ext mild swelling and pain to calf, concern for DVT secondary to recent travel, US neg for dvt and + hematoma - explained to patient and understands RICE treatment and follow up plan.

## 2023-11-29 NOTE — ED ADULT NURSE NOTE - OBJECTIVE STATEMENT
Infectious Disease patient reports having returned from [place] yesterday, woke up this morning and felt a "big" lump to left lower extremity that has improved in size, pt amublating with steady gait, ecchymosis and swelling noted to left shin.

## 2023-11-29 NOTE — ED ADULT NURSE NOTE - NSFALLUNIVINTERV_ED_ALL_ED
Bed/Stretcher in lowest position, wheels locked, appropriate side rails in place/Call bell, personal items and telephone in reach/Instruct patient to call for assistance before getting out of bed/chair/stretcher/Non-slip footwear applied when patient is off stretcher/[place] to call system/Physically safe environment - no spills, clutter or unnecessary equipment/Purposeful proactive rounding/Room/bathroom lighting operational, light cord in reach

## 2023-12-06 ENCOUNTER — EMERGENCY (EMERGENCY)
Facility: HOSPITAL | Age: 58
LOS: 1 days | Discharge: HOME | End: 2023-12-06
Admitting: EMERGENCY MEDICINE
Payer: MEDICARE

## 2023-12-06 VITALS
SYSTOLIC BLOOD PRESSURE: 112 MMHG | HEART RATE: 75 BPM | RESPIRATION RATE: 18 BRPM | OXYGEN SATURATION: 100 % | DIASTOLIC BLOOD PRESSURE: 55 MMHG

## 2023-12-06 VITALS
DIASTOLIC BLOOD PRESSURE: 78 MMHG | RESPIRATION RATE: 16 BRPM | HEIGHT: 69 IN | OXYGEN SATURATION: 99 % | HEART RATE: 86 BPM | TEMPERATURE: 98 F | SYSTOLIC BLOOD PRESSURE: 134 MMHG | WEIGHT: 179.9 LBS

## 2023-12-06 DIAGNOSIS — E78.5 HYPERLIPIDEMIA, UNSPECIFIED: ICD-10-CM

## 2023-12-06 DIAGNOSIS — M79.605 PAIN IN LEFT LEG: ICD-10-CM

## 2023-12-06 DIAGNOSIS — I10 ESSENTIAL (PRIMARY) HYPERTENSION: ICD-10-CM

## 2023-12-06 DIAGNOSIS — E03.9 HYPOTHYROIDISM, UNSPECIFIED: ICD-10-CM

## 2023-12-06 DIAGNOSIS — I80.02 PHLEBITIS AND THROMBOPHLEBITIS OF SUPERFICIAL VESSELS OF LEFT LOWER EXTREMITY: ICD-10-CM

## 2023-12-06 PROCEDURE — 73590 X-RAY EXAM OF LOWER LEG: CPT

## 2023-12-06 PROCEDURE — 96372 THER/PROPH/DIAG INJ SC/IM: CPT

## 2023-12-06 PROCEDURE — 99283 EMERGENCY DEPT VISIT LOW MDM: CPT | Mod: 25

## 2023-12-06 PROCEDURE — 73590 X-RAY EXAM OF LOWER LEG: CPT | Mod: 26,LT

## 2023-12-06 PROCEDURE — 99284 EMERGENCY DEPT VISIT MOD MDM: CPT

## 2023-12-06 RX ORDER — KETOROLAC TROMETHAMINE 30 MG/ML
30 SYRINGE (ML) INJECTION ONCE
Refills: 0 | Status: DISCONTINUED | OUTPATIENT
Start: 2023-12-06 | End: 2023-12-06

## 2023-12-06 RX ORDER — IBUPROFEN 200 MG
1 TABLET ORAL
Qty: 15 | Refills: 0
Start: 2023-12-06 | End: 2023-12-10

## 2023-12-06 RX ADMIN — Medication 30 MILLIGRAM(S): at 09:05

## 2023-12-06 NOTE — ED ADULT NURSE NOTE - NSFALLUNIVINTERV_ED_ALL_ED
Bed/Stretcher in lowest position, wheels locked, appropriate side rails in place/Call bell, personal items and telephone in reach/Instruct patient to call for assistance before getting out of bed/chair/stretcher/Non-slip footwear applied when patient is off stretcher/[place] to call system/Physically safe environment - no spills, clutter or unnecessary equipment/Purposeful proactive rounding/Room/bathroom lighting operational, light cord in reach Bed/Stretcher in lowest position, wheels locked, appropriate side rails in place/Call bell, personal items and telephone in reach/Instruct patient to call for assistance before getting out of bed/chair/stretcher/Non-slip footwear applied when patient is off stretcher/[place] to call system/Physically safe environment - no spills, clutter or unnecessary equipment/Purposeful proactive rounding/Room/bathroom lighting operational, light cord in reach

## 2023-12-06 NOTE — ED ADULT NURSE NOTE - OBJECTIVE STATEMENT
Received ambulatory with steady gait with chief complaints of pain and redness to L lower leg x 1 week. Work up negative last week.     Patient AOX4, speaking full sentences.  Patient denies chest pain, shortness of breath, difficulty breathing and any form of distress not noted. Resps even and nonlabored. Moves all extremities. No obvious trauma/injury/deformity noted. Patient oriented to ED area. All needs attended. POC reviewed. Purposeful proactive hourly rounding in progress.

## 2023-12-06 NOTE — ED PROVIDER NOTE - OBJECTIVE STATEMENT
59 y/o male with a PMHx of HTN, hypothyroidism, HLD is here in the ER c/o left leg pain x1 week. Pain is located on his medial calf and reports increase pain with bruising. Pain is constant and does not improve or worsen with movement. Denies taking anything for the pain. States he thinks he may have hit the edge of the shower tub when the pain started. Otherwise denies the following: fever, chills, bleeding, discharge, redness, increased warmth, inability to walk, chest pain, sob, hx of dvt/pe, itching, n/t to extremities. 57 y/o male with a PMHx of HTN, hypothyroidism, HLD is here in the ER c/o left leg pain x1 week. Pain is located on his medial calf and reports increase pain with bruising. Pain is constant and does not improve or worsen with movement. Denies taking anything for the pain. States he thinks he may have hit the edge of the shower tub when the pain started. Otherwise denies the following: fever, chills, bleeding, discharge, redness, increased warmth, inability to walk, chest pain, sob, hx of dvt/pe, itching, n/t to extremities.

## 2023-12-06 NOTE — ED ADULT NURSE NOTE - NS ED PATIENT SAFETY CONCERN
Katt Gmaez  1967    Medication: Gabapentin and Vicodin     Provider: Rosalio Salter MD  Preferred Contact Number: [phone] (mobile)    [facility]    Patient instructed to call pharmacy directly for future refills.      Advised patient that the nurse will call if there are questions or concerns, otherwise refill processing may take 48-72 hours.      No

## 2023-12-06 NOTE — ED PROVIDER NOTE - CLINICAL SUMMARY MEDICAL DECISION MAKING FREE TEXT BOX
57 y/o male here with left calf pain x1 week with ecchymosis and mild pitting edema. Recent labs and pe reassuring and likely not infectious cause. US conducted last week which was negative for dvt or hematoma. Clinically hx and presentation consistent with thrombophlebitis. Recent coags negative. Do not suspect compartment syndrome and nvi. Will get xray and start pain management and strict rice txt. 59 y/o male here with left calf pain x1 week with ecchymosis and mild pitting edema. Recent labs and pe reassuring and likely not infectious cause. US conducted last week which was negative for dvt or hematoma. Clinically hx and presentation consistent with thrombophlebitis given recent imaging. Recent coags negative. Do not suspect compartment syndrome and nvi. Will get xray and start pain management and strict rice txt. Xray negative. Agree with rads reading. Pain improved. Advised rice and follow up with primary care provider. I have discussed the discharge plan with the patient. The patient agrees with the plan, as discussed.  The patient understands Emergency Department diagnosis is a preliminary diagnosis often based on limited information and that the patient must adhere to the follow-up plan as discussed.  The patient understands that if the symptoms worsen or if prescribed medications do not have the desired/planned effect that the patient may return to the Emergency Department at any time for further evaluation and treatment.

## 2023-12-06 NOTE — ED ADULT NURSE REASSESSMENT NOTE - NS ED NURSE REASSESS COMMENT FT1
pt received alert and oriented x3, breathing at ease on room air, complaining of "soreness" to the left lower extremity, and is concerned with "my leg being septic." denies fever/chills. Pt medicated with toradol and percocet, comfort measures implemented, pt currently sleeping in recliner, no apparent distress noted, ongoing monitoring.

## 2023-12-06 NOTE — ED PROVIDER NOTE - PATIENT PORTAL LINK FT
You can access the FollowMyHealth Patient Portal offered by [facility] by registering at the following website: [email or web address]. By joining [employer]’s FollowMyHealth portal, you will also be able to view your health information using other applications (apps) compatible with our system. You can access the FollowMyHealth Patient Portal offered by [facility] by registering at the following website: [email or web address]. By joining [employer]’s FollowMyHealth portal, you will also be able to view your health information using other applications (apps) compatible with our system.

## 2023-12-06 NOTE — ED PROVIDER NOTE - NSFOLLOWUPINSTRUCTIONS_ED_ALL_ED_FT
You were given an injection of toradol and a percocet to help with pain. Please continue taking motrin for pain and swelling and elevate your leg and use the ace wrap for compression as discussed. Return to the Emergency Department if you have any new or worsening symptoms, or if you have any concerns.

## 2023-12-06 NOTE — ED PROVIDER NOTE - NS ED ROS FT
General: no fever, chills, confusion  Cardiac: no chest pain, chest tightness, palpitations  Lungs: no sob, difficulty breathing  Abdomen: no abdominal pain, nausea, vomiting, diarrhea, constipation, nml BM  MSK: + left medial calf pain + ecchymosis + swelling    All other systems negative except as per HPI

## 2024-01-10 ENCOUNTER — APPOINTMENT (OUTPATIENT)
Dept: UROLOGY | Facility: CLINIC | Age: 59
End: 2024-01-10
Payer: MEDICARE

## 2024-01-10 DIAGNOSIS — R68.82 DECREASED LIBIDO: ICD-10-CM

## 2024-01-10 DIAGNOSIS — R31.29 OTHER MICROSCOPIC HEMATURIA: ICD-10-CM

## 2024-01-10 DIAGNOSIS — N52.01 ERECTILE DYSFUNCTION DUE TO ARTERIAL INSUFFICIENCY: ICD-10-CM

## 2024-01-10 DIAGNOSIS — K42.9 UMBILICAL HERNIA W/OUT OBSTRUCTION OR GANGRENE: ICD-10-CM

## 2024-01-10 DIAGNOSIS — N48.6 INDURATION PENIS PLASTICA: ICD-10-CM

## 2024-01-10 DIAGNOSIS — R97.20 ELEVATED PROSTATE, SPECIFIC ANTIGEN [PSA]: ICD-10-CM

## 2024-01-10 DIAGNOSIS — Z00.00 ENCOUNTER FOR GENERAL ADULT MEDICAL EXAMINATION W/OUT ABNORMAL FINDINGS: ICD-10-CM

## 2024-01-10 DIAGNOSIS — R39.9 UNSPECIFIED SYMPTOMS AND SIGNS INVOLVING THE GENITOURINARY SYSTEM: ICD-10-CM

## 2024-01-10 PROCEDURE — 99215 OFFICE O/P EST HI 40 MIN: CPT

## 2024-01-10 PROCEDURE — 51798 US URINE CAPACITY MEASURE: CPT

## 2024-01-10 NOTE — ASSESSMENT
[FreeTextEntry1] : I discussed the findings and options with Mr. LAYA DURAND in detail.  The PSA was repeated. If the elevation persists, he will need a prostate MRI.  Depending on the MRI findings, a prostate biopsy may be considered.  I discussed both procedures with him, including the risks of a prostate biopsy (sepsis*, retention, bleeding, infection).  If the current PSA remains within the previously normal range, he should repeat a PSA in 6 months. *His son  of SE[employer]IS.  Regarding his voiding symptoms, I outlined behavioral modification, specifically decreasing his caffeine intake.  In addition, he will start on the alfuzosin if he is not taking this currently.  Mr. Durand does not want any further intervention for the erectile dysfunction; he may continue using Viagra with its variable efficacy.  Providing there is no need for further urologic intervention (providing the PSA remained stable) I look forward to seeing Mr. Durand in 1 year (PSA, sonogram).

## 2024-01-11 LAB — PSA SERPL-MCNC: 2.71 NG/ML

## 2024-01-11 RX ORDER — ALFUZOSIN HYDROCHLORIDE 10 MG/1
10 TABLET, EXTENDED RELEASE ORAL
Qty: 90 | Refills: 3 | Status: ACTIVE | COMMUNITY
Start: 2024-01-11 | End: 1900-01-01

## 2024-01-12 ENCOUNTER — NON-APPOINTMENT (OUTPATIENT)
Age: 59
End: 2024-01-12

## 2024-01-12 LAB — BACTERIA UR CULT: NORMAL

## 2024-06-05 ENCOUNTER — OUTPATIENT (OUTPATIENT)
Dept: OUTPATIENT SERVICES | Facility: HOSPITAL | Age: 59
LOS: 1 days | End: 2024-06-05
Payer: MEDICARE

## 2024-06-05 ENCOUNTER — APPOINTMENT (OUTPATIENT)
Dept: SLEEP CENTER | Facility: HOSPITAL | Age: 59
End: 2024-06-05

## 2024-06-05 DIAGNOSIS — G47.33 OBSTRUCTIVE SLEEP APNEA (ADULT) (PEDIATRIC): ICD-10-CM

## 2024-06-05 PROCEDURE — 95811 POLYSOM 6/>YRS CPAP 4/> PARM: CPT | Mod: 26
